# Patient Record
Sex: FEMALE | Race: WHITE | Employment: OTHER | URBAN - METROPOLITAN AREA
[De-identification: names, ages, dates, MRNs, and addresses within clinical notes are randomized per-mention and may not be internally consistent; named-entity substitution may affect disease eponyms.]

---

## 2017-04-06 PROBLEM — Z09 FOLLOW-UP EXAMINATION: Status: ACTIVE | Noted: 2017-04-06

## 2017-04-12 ENCOUNTER — OFFICE VISIT (OUTPATIENT)
Dept: CARDIOLOGY CLINIC | Age: 62
End: 2017-04-12

## 2017-04-12 VITALS
HEIGHT: 68 IN | WEIGHT: 201 LBS | BODY MASS INDEX: 30.46 KG/M2 | OXYGEN SATURATION: 98 % | HEART RATE: 86 BPM | SYSTOLIC BLOOD PRESSURE: 124 MMHG | DIASTOLIC BLOOD PRESSURE: 80 MMHG

## 2017-04-12 DIAGNOSIS — I25.10 CORONARY ARTERY DISEASE INVOLVING NATIVE CORONARY ARTERY OF NATIVE HEART WITHOUT ANGINA PECTORIS: ICD-10-CM

## 2017-04-12 DIAGNOSIS — I10 ESSENTIAL HYPERTENSION: ICD-10-CM

## 2017-04-12 DIAGNOSIS — E78.00 PURE HYPERCHOLESTEROLEMIA: ICD-10-CM

## 2017-04-12 PROCEDURE — 99214 OFFICE O/P EST MOD 30 MIN: CPT | Performed by: INTERNAL MEDICINE

## 2017-04-20 RX ORDER — NITROGLYCERIN 0.4 MG/1
0.4 TABLET SUBLINGUAL EVERY 5 MIN PRN
Qty: 25 TABLET | Refills: 3 | Status: SHIPPED | OUTPATIENT
Start: 2017-04-20 | End: 2017-04-20 | Stop reason: SDUPTHER

## 2017-04-20 RX ORDER — NITROGLYCERIN 0.4 MG/1
TABLET SUBLINGUAL
Qty: 25 TABLET | Refills: 3 | Status: SHIPPED | OUTPATIENT
Start: 2017-04-20 | End: 2018-11-12 | Stop reason: SDUPTHER

## 2017-05-05 ENCOUNTER — TELEPHONE (OUTPATIENT)
Dept: CARDIOLOGY CLINIC | Age: 62
End: 2017-05-05

## 2017-05-31 ENCOUNTER — TELEPHONE (OUTPATIENT)
Dept: CARDIOLOGY CLINIC | Age: 62
End: 2017-05-31

## 2017-08-08 RX ORDER — CLOPIDOGREL BISULFATE 75 MG/1
75 TABLET ORAL DAILY
Qty: 90 TABLET | Refills: 3 | Status: SHIPPED | OUTPATIENT
Start: 2017-08-08 | End: 2018-07-25 | Stop reason: SDUPTHER

## 2017-09-19 RX ORDER — ATORVASTATIN CALCIUM 20 MG/1
TABLET, FILM COATED ORAL
Qty: 90 TABLET | Refills: 1 | Status: SHIPPED | OUTPATIENT
Start: 2017-09-19 | End: 2018-03-25 | Stop reason: SDUPTHER

## 2017-10-13 ENCOUNTER — TELEPHONE (OUTPATIENT)
Dept: CARDIOLOGY CLINIC | Age: 62
End: 2017-10-13

## 2017-10-13 NOTE — TELEPHONE ENCOUNTER
Dalia Darby is calling stating that Dr. Beni Torre usually wants her to have a fasting Lipid Panel 1 week before visit. Her next visit is 10/24/2017. Dalia Darby states she is home bound and if we call [facility] @ [phone] they will come out on 10/17/2017 and take her blood. Martina's last visit with Dr. Beni Torre was 4/12/2017. Last Lipid was 8/24/2016      Please call Dalia Darby at [phone] to let her know if Dr. Beni Torre does or does not want the Lipid panel done before next visit.

## 2017-10-13 NOTE — TELEPHONE ENCOUNTER
Called patient. Bishop Harvey said that she has a HCN coming to her home weekly and they will draw any labs required. I requested the name and number of HCN. [facility] [phone]. I gave verbal orders for BMP, Lipid and Liver profile. Nehemiah will fax order for Dr. Sherry Moe to sign and return. Bennyalexus Tolbert will make arrangements with patient so we have patient's lab results by 10/23/17 since patient has an appointment the next day. Called patient to make aware. Patient verbalized and confirmed understanding.

## 2017-10-20 ENCOUNTER — HOSPITAL ENCOUNTER (OUTPATIENT)
Dept: MAMMOGRAPHY | Age: 62
Discharge: HOME | End: 2017-10-20
Admitting: INTERNAL MEDICINE

## 2017-10-20 DIAGNOSIS — I12.9 HYPERTENSIVE CHRONIC KIDNEY DISEASE WITH STAGE 1 THROUGH STAGE 4 CHRONIC KIDNEY DISEASE, OR UNSPECIFIED CHRONIC KIDNEY DISEASE: ICD-10-CM

## 2017-10-20 DIAGNOSIS — Z12.39 BREAST CANCER SCREENING: ICD-10-CM

## 2017-10-24 ENCOUNTER — OFFICE VISIT (OUTPATIENT)
Dept: CARDIOLOGY CLINIC | Age: 62
End: 2017-10-24

## 2017-10-24 ENCOUNTER — HOSPITAL ENCOUNTER (OUTPATIENT)
Dept: NON INVASIVE DIAGNOSTICS | Age: 62
Discharge: HOME | End: 2017-10-24
Attending: INTERNAL MEDICINE | Admitting: INTERNAL MEDICINE

## 2017-10-24 VITALS
SYSTOLIC BLOOD PRESSURE: 126 MMHG | OXYGEN SATURATION: 98 % | HEIGHT: 68 IN | DIASTOLIC BLOOD PRESSURE: 76 MMHG | WEIGHT: 216 LBS | BODY MASS INDEX: 32.74 KG/M2 | HEART RATE: 86 BPM

## 2017-10-24 DIAGNOSIS — E78.00 PURE HYPERCHOLESTEROLEMIA: ICD-10-CM

## 2017-10-24 DIAGNOSIS — I10 ESSENTIAL HYPERTENSION: ICD-10-CM

## 2017-10-24 DIAGNOSIS — I25.10 CORONARY ARTERY DISEASE INVOLVING NATIVE CORONARY ARTERY OF NATIVE HEART WITHOUT ANGINA PECTORIS: Primary | ICD-10-CM

## 2017-10-24 PROCEDURE — 3014F SCREEN MAMMO DOC REV: CPT | Performed by: INTERNAL MEDICINE

## 2017-10-24 PROCEDURE — 99214 OFFICE O/P EST MOD 30 MIN: CPT | Performed by: INTERNAL MEDICINE

## 2017-10-24 PROCEDURE — G8427 DOCREV CUR MEDS BY ELIG CLIN: HCPCS | Performed by: INTERNAL MEDICINE

## 2017-10-24 PROCEDURE — G8598 ASA/ANTIPLAT THER USED: HCPCS | Performed by: INTERNAL MEDICINE

## 2017-10-24 PROCEDURE — 3017F COLORECTAL CA SCREEN DOC REV: CPT | Performed by: INTERNAL MEDICINE

## 2017-10-24 PROCEDURE — 1036F TOBACCO NON-USER: CPT | Performed by: INTERNAL MEDICINE

## 2017-10-24 PROCEDURE — G8484 FLU IMMUNIZE NO ADMIN: HCPCS | Performed by: INTERNAL MEDICINE

## 2017-10-24 PROCEDURE — G8417 CALC BMI ABV UP PARAM F/U: HCPCS | Performed by: INTERNAL MEDICINE

## 2017-10-24 RX ORDER — INSULIN GLARGINE 100 [IU]/ML
75 INJECTION, SOLUTION SUBCUTANEOUS DAILY
COMMUNITY

## 2017-10-24 NOTE — PATIENT INSTRUCTIONS
Patient Education        High Cholesterol: Care Instructions  Your Care Instructions  Cholesterol is a type of fat in your blood. It is needed for many body functions, such as making new cells. Cholesterol is made by your body. It also comes from food you eat. High cholesterol means that you have too much of the fat in your blood. This raises your risk of a heart attack and stroke. LDL and HDL are part of your total cholesterol. LDL is the \"bad\" cholesterol. High LDL can raise your risk for heart disease, heart attack, and stroke. HDL is the \"good\" cholesterol. It helps clear bad cholesterol from the body. High HDL is linked with a lower risk of heart disease, heart attack, and stroke. Your cholesterol levels help your doctor find out your risk for having a heart attack or stroke. You and your doctor can talk about whether you need to lower your risk and what treatment is best for you. A heart-healthy lifestyle along with medicines can help lower your cholesterol and your risk. The way you choose to lower your risk will depend on how high your risk is for heart attack and stroke. It will also depend on how you feel about taking medicines. Follow-up care is a key part of your treatment and safety. Be sure to make and go to all appointments, and call your doctor if you are having problems. It's also a good idea to know your test results and keep a list of the medicines you take. How can you care for yourself at home? · Eat a variety of foods every day. Good choices include fruits, vegetables, whole grains (like oatmeal), dried beans and peas, nuts and seeds, soy products (like tofu), and fat-free or low-fat dairy products. · Replace butter, margarine, and hydrogenated or partially hydrogenated oils with olive and canola oils. (Canola oil margarine without trans fat is fine.)  · Replace red meat with fish, poultry, and soy protein (like tofu).   · Limit processed and packaged foods like chips, crackers, and cookies. · Bake, broil, or steam foods. Don't bartlett them. · Be physically active. Get at least 30 minutes of exercise on most days of the week. Walking is a good choice. You also may want to do other activities, such as running, swimming, cycling, or playing tennis or team sports. · Stay at a healthy weight or lose weight by making the changes in eating and physical activity listed above. Losing just a small amount of weight, even 5 to 10 pounds, can reduce your risk for having a heart attack or stroke. · Do not smoke. When should you call for help? Watch closely for changes in your health, and be sure to contact your doctor if:  · You need help making lifestyle changes. · You have questions about your medicine. Where can you learn more? Go to https://Allokapepiceweb.Wellocities. org and sign in to your [employer] account. Enter L720 in the [employer] box to learn more about \"High Cholesterol: Care Instructions. \"     If you do not have an account, please click on the \"Sign Up Now\" link. Current as of: April 3, 2017  Content Version: 11.3  © 9967-4221 [employer]. Care instructions adapted under license by [facility] ([facility]). If you have questions about a medical condition or this instruction, always ask your healthcare professional. [address] any warranty or liability for your use of this information. Patient Education        High Blood Pressure: Care Instructions  Your Care Instructions  If your blood pressure is usually above 140/90, you have high blood pressure, or hypertension. That means the top number is 140 or higher or the bottom number is 90 or higher, or both. Despite what a lot of people think, high blood pressure usually doesn't cause headaches or make you feel dizzy or lightheaded. It usually has no symptoms. But it does increase your risk for heart attack, stroke, and kidney or eye damage.  The higher your blood pressure, the more your risk or do other activities. · Avoid or limit alcohol. Talk to your doctor about whether you can drink any alcohol. · Try to limit how much sodium you eat to less than 2,300 milligrams (mg) a day. Your doctor may ask you to try to eat less than 1,500 mg a day. · Eat plenty of fruits (such as bananas and oranges), vegetables, legumes, whole grains, and low-fat dairy products. · Lower the amount of saturated fat in your diet. Saturated fat is found in animal products such as milk, cheese, and meat. Limiting these foods may help you lose weight and also lower your risk for heart disease. · Do not smoke. Smoking increases your risk for heart attack and stroke. If you need help quitting, talk to your doctor about stop-smoking programs and medicines. These can increase your chances of quitting for good. When should you call for help? Call 911 anytime you think you may need emergency care. This may mean having symptoms that suggest that your blood pressure is causing a serious heart or blood vessel problem. Your blood pressure may be over 180/110. For example, call 911 if:  · You have symptoms of a heart attack. These may include:  ¨ Chest pain or pressure, or a strange feeling in the chest.  ¨ Sweating. ¨ Shortness of breath. ¨ Nausea or vomiting. ¨ Pain, pressure, or a strange feeling in the back, neck, jaw, or upper belly or in one or both shoulders or arms. ¨ Lightheadedness or sudden weakness. ¨ A fast or irregular heartbeat. · You have symptoms of a stroke. These may include:  ¨ Sudden numbness, tingling, weakness, or loss of movement in your face, arm, or leg, especially on only one side of your body. ¨ Sudden vision changes. ¨ Sudden trouble speaking. ¨ Sudden confusion or trouble understanding simple statements. ¨ Sudden problems with walking or balance. ¨ A sudden, severe headache that is different from past headaches. · You have severe back or belly pain.   Do not wait until your blood pressure comes down on its own. Get help right away. Call your doctor now or seek immediate care if:  · Your blood pressure is much higher than normal (such as 180/110 or higher), but you don't have symptoms. · You think high blood pressure is causing symptoms, such as:  ¨ Severe headache. ¨ Blurry vision. Watch closely for changes in your health, and be sure to contact your doctor if:  · Your blood pressure measures 140/90 or higher at least 2 times. That means the top number is 140 or higher or the bottom number is 90 or higher, or both. · You think you may be having side effects from your blood pressure medicine. · Your blood pressure is usually normal, but it goes above normal at least 2 times. Where can you learn more? Go to https://HighGround.[employer]. org and sign in to your [employer] account. Enter S430 in the [employer] box to learn more about \"High Blood Pressure: Care Instructions. \"     If you do not have an account, please click on the \"Sign Up Now\" link. Current as of: August 8, 2016  Content Version: 11.3  © 7277-4235 [employer]. Care instructions adapted under license by [facility] ([facility]). If you have questions about a medical condition or this instruction, always ask your healthcare professional. [address] any warranty or liability for your use of this information.

## 2017-10-24 NOTE — PROGRESS NOTES
[facility]      Cardiology follow up    Jeanna Grant  1955 October 24, 2017     CC: \"I was in the hospital in [place]"    HPI:  The patient is 58 y.o. female with a past medical history significant for CAD,S/p PCI and HTN who is here for a follow up. She was admitted to [facility] in 8/15/2017 for severe dehydration secondary to intractable nausea vomiting and diarrhea for 10 days prior. She developed acute renal failure and acute pancreatitis. She was seen by nephrology and is currently following with them. She reports her BP has been up and down, she is wearing a 24 hr BP monitor that she had placed today. She is now living in a SNF due to extreme weakness after her admission. She is getting stronger and is almost ready to walk without her cane. She did have one episode of chest tightness but it was during the time when she was 30 lbs from all the IV fluid therre were giving her. She has had no additional chest pain. All of her medications were adjusted and she has been off her statin therapy but will be resuming. She is compliant with her medications and tolerating well. He denies CP, pressure, tightness, edema, SOB, heart racing, palpitations, lightheaded, dizziness, PND or orthopnea.       Past Medical History:   Diagnosis Date    Allergic     Anemia     Angina pectoris (HCC)     Anxiety     Arthritis     CAD (coronary artery disease)     Chronic kidney disease     Colitis     Depression     Frequent headaches     GERD (gastroesophageal reflux disease)     Gout     Heart disease     Heart murmur     Hyperlipidemia     Hypertension     IBS (irritable bowel syndrome)     Lung disease     Migraines     Obesity     Shortness of breath     Sjogren's disease [facility]) September 2015    Dr. Peggy Jason Thyroid disease     hypo when younger    Type 2 diabetes mellitus without complication ([facility] [address]     Type II or unspecified type diabetes mellitus without mention of complication, not stated as uncontrolled     Unspecified cerebral artery occlusion with cerebral infarction     TIA X 2     Past Surgical History:   Procedure Laterality Date    CARDIAC CATHETERIZATION      CORONARY ANGIOPLASTY WITH STENT PLACEMENT  3/27/2014    DILATION AND CURETTAGE OF UTERUS      X2    HYSTERECTOMY      OVARY REMOVAL  1991    SINUS SURGERY      URETHRAL STRICTURE DILATATION       Family History   Problem Relation Age of Onset    Diabetes Mother     Heart Disease Mother     Osteoarthritis Mother     Cancer Mother      colon    Heart Disease Father     Coronary Art Dis Father     Hypertension Father     Seizures Son     Osteoarthritis Sister     Seizures Other      family history    Diabetes Other      family history    Osteoarthritis Other      family history    Coronary Art Dis Other      family history    Alzheimer's Disease Other      family history    Hypertension Brother     Elevated Lipids Brother     Osteoarthritis Brother     Allergies Brother      Social History   Substance Use Topics    Smoking status: Never Smoker    Smokeless tobacco: Never Used    Alcohol use No      Comment: not reorted       Allergies   Allergen Reactions    Bee Venom     Hydroxychloroquine Other (See Comments)     pancreatitis    Macrodantin [Nitrofurantoin]      High fevers    Quercus Robur     Sulfa Antibiotics      Doesn't remember    Wasp Venom     Ciprofloxacin Nausea And Vomiting    Flagyl [Metronidazole] Nausea And Vomiting     Current Outpatient Prescriptions   Medication Sig Dispense Refill    insulin glargine (LANTUS) 100 UNIT/ML injection vial Inject 30 Units into the skin nightly      atorvastatin (LIPITOR) 20 MG tablet TAKE 1 TABLET BY MOUTH EVERY NIGHT 90 tablet 1    clopidogrel (PLAVIX) 75 MG tablet Take 1 tablet by mouth daily 90 tablet 3    nitroGLYCERIN (NITROSTAT) 0.4 MG SL tablet PLACE 1 TABLET UNDER THE TONGUE EVERY 5 MINUTES AS NEEDED FOR CHEST PAIN 25 tablet 3    metoprolol succinate (TOPROL XL) 25 MG extended release tablet TAKE 1 TABLET BY MOUTH DAILY (Patient taking differently: 1/2 tablet daily) 90 tablet 3    Cetirizine HCl (ZYRTEC ALLERGY PO) Take by mouth      verapamil (CALAN-SR) 180 MG CR tablet Take 90 mg by mouth every morning       Liraglutide (VICTOZA) 18 MG/3ML SOPN SC injection Inject 1.2 mg into the skin daily      pilocarpine (SALAGEN) 5 MG tablet Take 5 mg by mouth 3 times daily      bumetanide (BUMEX) 2 MG tablet Take 1 mg by mouth daily       aspirin 81 MG chewable tablet Take 1 tablet by mouth daily. 30 tablet 3    Linaclotide (LINZESS) 290 MCG CAPS Take 290 mcg by mouth every morning (before breakfast)       tramadol (ULTRAM) 50 MG tablet Take 50 mg by mouth every 8 hours as needed for Pain       dexlansoprazole (DEXILANT) 60 MG CPDR capsule Take 60 mg by mouth daily.  metformin (GLUCOPHAGE) 500 MG tablet Take 500 mg by mouth 2 times daily (with meals)       FLUTICASONE PROPIONATE, NASAL, NA 2 sprays by Nasal route daily as needed (rhinitis)       Potassium Chloride (KLOR-CON 10 PO) Take 2 tablets by mouth daily       lisinopril (PRINIVIL;ZESTRIL) 5 MG tablet Take 5 mg by mouth 2 times daily       Multiple Vitamins-Minerals (MULTI FOR HER PO) Take 1 tablet by mouth daily.  docusate sodium (STOOL SOFTENER) 100 MG capsule Take 200 mg by mouth 2 times daily 2 tablets      Calcium Carbonate Antacid (TUMS E-X 750 PO) Take 1 tablet by mouth daily. No current facility-administered medications for this visit. Review of Systems:  Review of systems is as detailed above and otherwise negative.      Physical Exam:   /76   Pulse 86   Ht 5' 8\" (1.727 m)   Wt 216 lb (98 kg) Comment: without shoes  SpO2 98%   BMI 32.84 kg/m²   Wt Readings from Last 3 Encounters:   10/24/17 216 lb (98 kg)   04/12/17 201 lb (91.2 kg)   04/06/17 203 lb (92.1 kg)     Constitutional: She is oriented to person, place, and

## 2017-12-22 RX ORDER — METOPROLOL SUCCINATE 25 MG/1
TABLET, EXTENDED RELEASE ORAL
Qty: 90 TABLET | Refills: 0 | Status: SHIPPED | OUTPATIENT
Start: 2017-12-22 | End: 2018-10-09 | Stop reason: SDUPTHER

## 2017-12-27 ENCOUNTER — TELEPHONE (OUTPATIENT)
Dept: CARDIOLOGY CLINIC | Age: 62
End: 2017-12-27

## 2017-12-27 NOTE — TELEPHONE ENCOUNTER
Dr. Devon Espinoza     Patient last seen on 10/24/17. DX: CAD, HTN, HLD. S/p PTCA/Stent on 3/27/14. Patient is needing colonoscopy and endoscopy. Will need to hold Plavix and ASA for 5 days prior. Procedures scheduled for 1/4/18. Please advise if patient able to proceed and hold medications.

## 2018-03-29 RX ORDER — ATORVASTATIN CALCIUM 20 MG/1
TABLET, FILM COATED ORAL
Qty: 90 TABLET | Refills: 3 | Status: SHIPPED | OUTPATIENT
Start: 2018-03-29 | End: 2018-06-26 | Stop reason: DRUGHIGH

## 2018-04-20 RX ORDER — ATORVASTATIN CALCIUM 20 MG/1
TABLET, FILM COATED ORAL
Qty: 90 TABLET | Refills: 1 | Status: SHIPPED | OUTPATIENT
Start: 2018-04-20 | End: 2018-05-02 | Stop reason: SDUPTHER

## 2018-05-02 ENCOUNTER — OFFICE VISIT (OUTPATIENT)
Dept: CARDIOLOGY CLINIC | Age: 63
End: 2018-05-02

## 2018-05-02 VITALS
OXYGEN SATURATION: 99 % | HEIGHT: 68 IN | WEIGHT: 222 LBS | DIASTOLIC BLOOD PRESSURE: 78 MMHG | BODY MASS INDEX: 33.65 KG/M2 | SYSTOLIC BLOOD PRESSURE: 130 MMHG | HEART RATE: 86 BPM

## 2018-05-02 DIAGNOSIS — I10 ESSENTIAL HYPERTENSION: ICD-10-CM

## 2018-05-02 DIAGNOSIS — I25.10 CORONARY ARTERY DISEASE INVOLVING NATIVE CORONARY ARTERY OF NATIVE HEART WITHOUT ANGINA PECTORIS: Primary | ICD-10-CM

## 2018-05-02 DIAGNOSIS — E78.2 MIXED HYPERLIPIDEMIA: ICD-10-CM

## 2018-05-02 PROCEDURE — G8598 ASA/ANTIPLAT THER USED: HCPCS | Performed by: INTERNAL MEDICINE

## 2018-05-02 PROCEDURE — G8417 CALC BMI ABV UP PARAM F/U: HCPCS | Performed by: INTERNAL MEDICINE

## 2018-05-02 PROCEDURE — 99214 OFFICE O/P EST MOD 30 MIN: CPT | Performed by: INTERNAL MEDICINE

## 2018-05-02 PROCEDURE — 3017F COLORECTAL CA SCREEN DOC REV: CPT | Performed by: INTERNAL MEDICINE

## 2018-05-02 PROCEDURE — G8427 DOCREV CUR MEDS BY ELIG CLIN: HCPCS | Performed by: INTERNAL MEDICINE

## 2018-05-02 PROCEDURE — 1036F TOBACCO NON-USER: CPT | Performed by: INTERNAL MEDICINE

## 2018-05-02 PROCEDURE — 93000 ELECTROCARDIOGRAM COMPLETE: CPT | Performed by: INTERNAL MEDICINE

## 2018-05-02 RX ORDER — TOPIRAMATE 100 MG/1
100 TABLET, FILM COATED ORAL DAILY
COMMUNITY
End: 2020-08-24 | Stop reason: ALTCHOICE

## 2018-05-02 RX ORDER — ICOSAPENT ETHYL 1000 MG/1
2 CAPSULE ORAL 2 TIMES DAILY
Qty: 16 CAPSULE | Refills: 0 | COMMUNITY
Start: 2018-05-02 | End: 2018-11-12

## 2018-05-10 ENCOUNTER — TELEPHONE (OUTPATIENT)
Dept: CARDIOLOGY CLINIC | Age: 63
End: 2018-05-10

## 2018-06-20 ENCOUNTER — TELEPHONE (OUTPATIENT)
Dept: CARDIOLOGY CLINIC | Age: 63
End: 2018-06-20

## 2018-06-26 RX ORDER — ATORVASTATIN CALCIUM 40 MG/1
40 TABLET, FILM COATED ORAL DAILY
COMMUNITY
End: 2018-06-26 | Stop reason: SDUPTHER

## 2018-06-26 RX ORDER — ATORVASTATIN CALCIUM 40 MG/1
40 TABLET, FILM COATED ORAL DAILY
Qty: 90 TABLET | Refills: 3 | Status: SHIPPED | OUTPATIENT
Start: 2018-06-26 | End: 2019-07-16 | Stop reason: SDUPTHER

## 2018-07-25 RX ORDER — CLOPIDOGREL BISULFATE 75 MG/1
75 TABLET ORAL DAILY
Qty: 90 TABLET | Refills: 3 | Status: SHIPPED | OUTPATIENT
Start: 2018-07-25 | End: 2019-07-27 | Stop reason: SDUPTHER

## 2018-09-13 ENCOUNTER — TELEPHONE (OUTPATIENT)
Dept: CARDIOLOGY CLINIC | Age: 63
End: 2018-09-13

## 2018-09-13 NOTE — TELEPHONE ENCOUNTER
Patient would like to know if Dr. Gino Andrade is ok with her increasing her domperidone (pt states she buys it in [facility])) from 10mg daily to 30mg daily. You can reach the pt at [phone].

## 2018-09-26 PROBLEM — Z09 FOLLOW-UP EXAMINATION: Status: RESOLVED | Noted: 2017-04-06 | Resolved: 2018-09-26

## 2018-10-10 RX ORDER — METOPROLOL SUCCINATE 25 MG/1
TABLET, EXTENDED RELEASE ORAL
Qty: 90 TABLET | Refills: 0 | Status: SHIPPED | OUTPATIENT
Start: 2018-10-10 | End: 2019-01-09 | Stop reason: SDUPTHER

## 2018-11-12 ENCOUNTER — OFFICE VISIT (OUTPATIENT)
Dept: CARDIOLOGY CLINIC | Age: 63
End: 2018-11-12
Payer: MEDICARE

## 2018-11-12 VITALS
HEIGHT: 68 IN | DIASTOLIC BLOOD PRESSURE: 68 MMHG | SYSTOLIC BLOOD PRESSURE: 116 MMHG | BODY MASS INDEX: 33.34 KG/M2 | HEART RATE: 90 BPM | OXYGEN SATURATION: 99 % | WEIGHT: 220 LBS

## 2018-11-12 DIAGNOSIS — E78.2 MIXED HYPERLIPIDEMIA: ICD-10-CM

## 2018-11-12 DIAGNOSIS — I25.10 CORONARY ARTERY DISEASE INVOLVING NATIVE CORONARY ARTERY OF NATIVE HEART WITHOUT ANGINA PECTORIS: Primary | ICD-10-CM

## 2018-11-12 DIAGNOSIS — I10 ESSENTIAL HYPERTENSION: ICD-10-CM

## 2018-11-12 PROCEDURE — G8598 ASA/ANTIPLAT THER USED: HCPCS | Performed by: INTERNAL MEDICINE

## 2018-11-12 PROCEDURE — 3017F COLORECTAL CA SCREEN DOC REV: CPT | Performed by: INTERNAL MEDICINE

## 2018-11-12 PROCEDURE — G8417 CALC BMI ABV UP PARAM F/U: HCPCS | Performed by: INTERNAL MEDICINE

## 2018-11-12 PROCEDURE — G8427 DOCREV CUR MEDS BY ELIG CLIN: HCPCS | Performed by: INTERNAL MEDICINE

## 2018-11-12 PROCEDURE — 1036F TOBACCO NON-USER: CPT | Performed by: INTERNAL MEDICINE

## 2018-11-12 PROCEDURE — G8484 FLU IMMUNIZE NO ADMIN: HCPCS | Performed by: INTERNAL MEDICINE

## 2018-11-12 PROCEDURE — 99214 OFFICE O/P EST MOD 30 MIN: CPT | Performed by: INTERNAL MEDICINE

## 2018-11-12 RX ORDER — RANITIDINE 150 MG/1
150 TABLET ORAL 2 TIMES DAILY
COMMUNITY
End: 2020-08-24 | Stop reason: ALTCHOICE

## 2018-11-12 RX ORDER — NITROGLYCERIN 0.4 MG/1
TABLET SUBLINGUAL
Qty: 25 TABLET | Refills: 6 | Status: SHIPPED | OUTPATIENT
Start: 2018-11-12 | End: 2020-09-04

## 2018-11-12 RX ORDER — BETHANECHOL CHLORIDE 25 MG/1
25 TABLET ORAL 2 TIMES DAILY
COMMUNITY

## 2018-11-12 RX ORDER — LUBIPROSTONE 24 UG/1
24 CAPSULE, GELATIN COATED ORAL 2 TIMES DAILY WITH MEALS
COMMUNITY

## 2019-01-09 RX ORDER — METOPROLOL SUCCINATE 25 MG/1
TABLET, EXTENDED RELEASE ORAL
Qty: 90 TABLET | Refills: 0 | Status: SHIPPED | OUTPATIENT
Start: 2019-01-09 | End: 2019-07-16 | Stop reason: SDUPTHER

## 2019-05-20 NOTE — PROGRESS NOTES
SURGERY      URETHRAL STRICTURE DILATATION       Family History   Problem Relation Age of Onset    Diabetes Mother     Heart Disease Mother     Osteoarthritis Mother     Cancer Mother         colon    Heart Disease Father     Coronary Art Dis Father     Hypertension Father     Seizures Son     Osteoarthritis Sister     Seizures Other         family history    Diabetes Other         family history    Osteoarthritis Other         family history    Coronary Art Dis Other         family history    Alzheimer's Disease Other         family history    Hypertension Brother     Elevated Lipids Brother     Osteoarthritis Brother     Allergies Brother      Social History     Tobacco Use    Smoking status: Never Smoker    Smokeless tobacco: Never Used   Substance Use Topics    Alcohol use: No     Comment: not reorted    Drug use: No     Comment: not reported       Allergies   Allergen Reactions    Bee Venom     Hydroxychloroquine Other (See Comments)     pancreatitis    Macrodantin [Nitrofurantoin]      High fevers    Quercus Robur     Sulfa Antibiotics      Doesn't remember    Wasp Venom     Ciprofloxacin Nausea And Vomiting    Flagyl [Metronidazole] Nausea And Vomiting     Current Outpatient Medications   Medication Sig Dispense Refill    metoprolol succinate (TOPROL XL) 25 MG extended release tablet TAKE 1 TABLET BY MOUTH DAILY 90 tablet 0    lubiprostone (AMITIZA) 24 MCG capsule Take 24 mcg by mouth 2 times daily (with meals)      bethanechol (URECHOLINE) 25 MG tablet Take 25 mg by mouth 2 times daily      ranitidine (ZANTAC) 150 MG tablet Take 150 mg by mouth 2 times daily      nitroGLYCERIN (NITROSTAT) 0.4 MG SL tablet PLACE 1 TABLET UNDER THE TONGUE EVERY 5 MINUTES AS NEEDED FOR CHEST PAIN 25 tablet 6    clopidogrel (PLAVIX) 75 MG tablet TAKE 1 TABLET BY MOUTH DAILY 90 tablet 3    atorvastatin (LIPITOR) 40 MG tablet Take 1 tablet by mouth daily 90 tablet 3    topiramate (TOPAMAX) 100 MG tablet Take 100 mg by mouth daily      insulin glargine (LANTUS) 100 UNIT/ML injection vial Inject 55 Units into the skin nightly       Cetirizine HCl (ZYRTEC ALLERGY PO) Take by mouth      verapamil (CALAN-SR) 180 MG CR tablet Take 90 mg by mouth every morning       Liraglutide (VICTOZA) 18 MG/3ML SOPN SC injection Inject 1.8 mg into the skin daily       pilocarpine (SALAGEN) 5 MG tablet Take 5 mg by mouth 3 times daily      bumetanide (BUMEX) 2 MG tablet Take 1 mg by mouth daily       aspirin 81 MG chewable tablet Take 1 tablet by mouth daily. 30 tablet 3    dexlansoprazole (DEXILANT) 60 MG CPDR capsule Take 60 mg by mouth daily.  metformin (GLUCOPHAGE) 500 MG tablet Take 500 mg by mouth 2 times daily (with meals)       FLUTICASONE PROPIONATE, NASAL, NA 2 sprays by Nasal route daily as needed (rhinitis)       Potassium Chloride (KLOR-CON 10 PO) Take 2 tablets by mouth daily       lisinopril (PRINIVIL;ZESTRIL) 5 MG tablet Take 5 mg by mouth 2 times daily Take 10 mg in the morning and 5 mg in the evening.  Multiple Vitamins-Minerals (MULTI FOR HER PO) Take 1 tablet by mouth daily.  docusate sodium (STOOL SOFTENER) 100 MG capsule Take 200 mg by mouth 2 times daily 2 tablets      Calcium Carbonate Antacid (TUMS E-X 750 PO) Take 1 tablet by mouth daily. No current facility-administered medications for this visit. Review of Systems:  Review of systems is as detailed above and otherwise negative. Physical Exam:   /72 (Site: Right Upper Arm, Position: Sitting)   Pulse 85   Ht 5' 8\" (1.727 m)   Wt 221 lb (100.2 kg) Comment: shoes on  SpO2 98%   BMI 33.60 kg/m²   Wt Readings from Last 3 Encounters:   05/21/19 221 lb (100.2 kg)   11/12/18 220 lb (99.8 kg)   05/02/18 222 lb (100.7 kg)     Constitutional: She is oriented to person, place, and time. She appears well-developed and well-nourished. In no acute distress. Head: Normocephalic and atraumatic.   Pupils equal and round. Neck: Neck supple. No JVP or carotid bruit appreciated. No mass and no thyromegaly present. No lymphadenopathy present. Cardiovascular: Normal rate. Normal heart sounds. Exam reveals no gallop and no friction rub. No murmur heard. Pulmonary/Chest: Effort normal and breath sounds normal. No respiratory distress. She has no wheezes, rhonchi or rales. Abdominal: Soft, non-tender. Bowel sounds are normal. She exhibits no organomegaly, mass or bruit. Extremities: No edema, cyanosis, or clubbing. Pulses are 2+ radial/dorsalis pedis/posterior tibial/carotid bilaterally. Neurological: No gross cranial nerve deficit. Coordination normal.   Skin: Skin is warm and dry. There is no rash or diaphoresis. Psychiatric: She has a normal mood and affect. Her speech is normal and behavior is normal.     Lab Review:   FLP:    Lab Results   Component Value Date    TRIG 181 04/12/2016    HDL 25 04/12/2016    HDL 34 04/03/2012    LDLCALC 72 04/12/2016    LABVLDL 36 04/12/2016     BUN/Creatinine:    Lab Results   Component Value Date    BUN 22 04/12/2016    CREATININE 1.1 04/12/2016     EKG Interpretation: 5/2/18 Sinus  Rhythm  Poor R wave progression     5/21/19 Sinus rhythm     Image Review:     Stress 4/12/2016  Summary    There is normal isotope uptake at stress and rest. There is no evidence of    myocardial ischemia or scar.        With Lexiscan vasodilator stress the patient had no significant ST changes.    Nondiagnostic for ischemia. Abdominal xray 4/2018  Splenic arteryaneurysm left upper quadrant    CT Abdomen and Pelvis 4/2018  Vasculature: Splenic artery 9.2 mm aneurysm       Assessment/Plan:    CAD (coronary artery disease)  She reports that she does have intermittent chest pain at rest. She does have nausea associated with this. She does have gastroparesis and is followed by GI.  I will order an exercise myoview stress test to evaluate this further since some of her symptoms are similar to what she had during her cardiac interventions in the past.. Pt is on BBlocker, Statin and Antiplatelet therapy. Hyperlipidemia  Last lipid profile was drawn on 10/2018 was abnormal for elevated TG and low HDL cholesterol but her  numbers look much better compared to the previous lipid profile. .  She reports her diabetes is under control and HbgA1C was 7.5. It may be contributing to her elevated triglycerides. Will repeat lipid profile. Essential hypertension  Blood pressure is stable today. BMP from 1/2019 stable. Follow up in 6 months. Thank you very much for allowing me to participate in the care of your patient. Please do not hesitate to contact me if you have any questions. This note was scribed in the presence of Dr Emerson Beasley, by Isabel Marshall RN  Physician Attestation:  The scribes documentation has been prepared under my direction and personally reviewed by me in its entirety. I confirm that the note above accurately reflects all work, treatment, procedures, and medical decision making performed by me.     Sincerely,  Princess Whitney MD      [address] [place] [address], [address] [address]  Ph: [phone]  Fax: [phone]

## 2019-05-21 ENCOUNTER — OFFICE VISIT (OUTPATIENT)
Dept: CARDIOLOGY CLINIC | Age: 64
End: 2019-05-21
Payer: MEDICARE

## 2019-05-21 VITALS
OXYGEN SATURATION: 98 % | DIASTOLIC BLOOD PRESSURE: 72 MMHG | HEIGHT: 68 IN | SYSTOLIC BLOOD PRESSURE: 118 MMHG | HEART RATE: 85 BPM | WEIGHT: 221 LBS | BODY MASS INDEX: 33.49 KG/M2

## 2019-05-21 DIAGNOSIS — E78.2 MIXED HYPERLIPIDEMIA: ICD-10-CM

## 2019-05-21 DIAGNOSIS — I25.10 CORONARY ARTERY DISEASE INVOLVING NATIVE CORONARY ARTERY OF NATIVE HEART WITHOUT ANGINA PECTORIS: Primary | ICD-10-CM

## 2019-05-21 DIAGNOSIS — I10 ESSENTIAL HYPERTENSION: ICD-10-CM

## 2019-05-21 DIAGNOSIS — R07.9 CHEST PAIN IN ADULT: ICD-10-CM

## 2019-05-21 PROCEDURE — 1036F TOBACCO NON-USER: CPT | Performed by: INTERNAL MEDICINE

## 2019-05-21 PROCEDURE — G8417 CALC BMI ABV UP PARAM F/U: HCPCS | Performed by: INTERNAL MEDICINE

## 2019-05-21 PROCEDURE — 93000 ELECTROCARDIOGRAM COMPLETE: CPT | Performed by: INTERNAL MEDICINE

## 2019-05-21 PROCEDURE — G8427 DOCREV CUR MEDS BY ELIG CLIN: HCPCS | Performed by: INTERNAL MEDICINE

## 2019-05-21 PROCEDURE — 3017F COLORECTAL CA SCREEN DOC REV: CPT | Performed by: INTERNAL MEDICINE

## 2019-05-21 PROCEDURE — G8598 ASA/ANTIPLAT THER USED: HCPCS | Performed by: INTERNAL MEDICINE

## 2019-05-21 PROCEDURE — 99214 OFFICE O/P EST MOD 30 MIN: CPT | Performed by: INTERNAL MEDICINE

## 2019-05-21 NOTE — PATIENT INSTRUCTIONS
Patient Education        Chest Pain: Care Instructions  Your Care Instructions    There are many things that can cause chest pain. Some are not serious and will get better on their own in a few days. But some kinds of chest pain need more testing and treatment. Your doctor may have recommended a follow-up visit in the next 8 to 12 hours. If you are not getting better, you may need more tests or treatment. Even though your doctor has released you, you still need to watch for any problems. The doctor carefully checked you, but sometimes problems can develop later. If you have new symptoms or if your symptoms do not get better, get medical care right away. If you have worse or different chest pain or pressure that lasts more than 5 minutes or you passed out (lost consciousness), call 911 or seek other emergency help right away. A medical visit is only one step in your treatment. Even if you feel better, you still need to do what your doctor recommends, such as going to all suggested follow-up appointments and taking medicines exactly as directed. This will help you recover and help prevent future problems. How can you care for yourself at home? · Rest until you feel better. · Take your medicine exactly as prescribed. Call your doctor if you think you are having a problem with your medicine. · Do not drive after taking a prescription pain medicine. When should you call for help? Call 911 if:    · You passed out (lost consciousness).     · You have severe difficulty breathing.     · You have symptoms of a heart attack. These may include:  ? Chest pain or pressure, or a strange feeling in your chest.  ? Sweating. ? Shortness of breath. ? Nausea or vomiting. ? Pain, pressure, or a strange feeling in your back, neck, jaw, or upper belly or in one or both shoulders or arms. ? Lightheadedness or sudden weakness. ? A fast or irregular heartbeat.   After you call 911, the  may tell you to chew 1 adult-strength or 2 to 4 low-dose aspirin. Wait for an ambulance. Do not try to drive yourself.    Call your doctor today if:    · You have any trouble breathing.     · Your chest pain gets worse.     · You are dizzy or lightheaded, or you feel like you may faint.     · You are not getting better as expected.     · You are having new or different chest pain. Where can you learn more? Go to https://[employer]CoverMe. org and sign in to your [facility] account. Enter A120 in the [employer] box to learn more about \"Chest Pain: Care Instructions. \"     If you do not have an account, please click on the \"Sign Up Now\" link. Current as of: September 23, 2018  Content Version: 12.0  © 6248-6072 [email or web address]. Care instructions adapted under license by [facility] ([facility]). If you have questions about a medical condition or this instruction, always ask your healthcare professional. [place] [address] any warranty or liability for your use of this information. Patient Education        Chest Pain: Care Instructions  Your Care Instructions    There are many things that can cause chest pain. Some are not serious and will get better on their own in a few days. But some kinds of chest pain need more testing and treatment. Your doctor may have recommended a follow-up visit in the next 8 to 12 hours. If you are not getting better, you may need more tests or treatment. Even though your doctor has released you, you still need to watch for any problems. The doctor carefully checked you, but sometimes problems can develop later. If you have new symptoms or if your symptoms do not get better, get medical care right away. If you have worse or different chest pain or pressure that lasts more than 5 minutes or you passed out (lost consciousness), call 911 or seek other emergency help right away. A medical visit is only one step in your treatment.  Even if you feel better, you still need disclaims any warranty or liability for your use of this information.

## 2019-06-10 ENCOUNTER — HOSPITAL ENCOUNTER (OUTPATIENT)
Dept: NON INVASIVE DIAGNOSTICS | Age: 64
Discharge: HOME | End: 2019-06-10
Payer: MEDICARE

## 2019-06-10 DIAGNOSIS — I25.10 CORONARY ARTERY DISEASE INVOLVING NATIVE CORONARY ARTERY OF NATIVE HEART WITHOUT ANGINA PECTORIS: ICD-10-CM

## 2019-06-10 DIAGNOSIS — R07.9 CHEST PAIN IN ADULT: ICD-10-CM

## 2019-06-10 DIAGNOSIS — E78.2 MIXED HYPERLIPIDEMIA: ICD-10-CM

## 2019-06-10 LAB
CHOLESTEROL, FASTING: 123 MG/DL (ref 0–199)
HDLC SERPL-MCNC: 34 MG/DL (ref 40–60)
LDL CHOLESTEROL CALCULATED: 58 MG/DL
LV EF: 70 %
LVEF MODALITY: NORMAL
TRIGLYCERIDE, FASTING: 157 MG/DL (ref 0–150)
VLDLC SERPL CALC-MCNC: 31 MG/DL

## 2019-06-10 PROCEDURE — 78452 HT MUSCLE IMAGE SPECT MULT: CPT

## 2019-06-10 PROCEDURE — 93017 CV STRESS TEST TRACING ONLY: CPT

## 2019-06-10 PROCEDURE — 3430000000 HC RX DIAGNOSTIC RADIOPHARMACEUTICAL: Performed by: INTERNAL MEDICINE

## 2019-06-10 PROCEDURE — 80061 LIPID PANEL: CPT

## 2019-06-10 PROCEDURE — A9502 TC99M TETROFOSMIN: HCPCS | Performed by: INTERNAL MEDICINE

## 2019-06-10 PROCEDURE — 36415 COLL VENOUS BLD VENIPUNCTURE: CPT

## 2019-06-10 RX ADMIN — TETROFOSMIN 10 MILLICURIE: 1.38 INJECTION, POWDER, LYOPHILIZED, FOR SOLUTION INTRAVENOUS at 12:17

## 2019-06-10 RX ADMIN — TETROFOSMIN 30 MILLICURIE: 1.38 INJECTION, POWDER, LYOPHILIZED, FOR SOLUTION INTRAVENOUS at 13:40

## 2019-07-16 RX ORDER — METOPROLOL SUCCINATE 25 MG/1
TABLET, EXTENDED RELEASE ORAL
Qty: 90 TABLET | Refills: 0 | Status: SHIPPED | OUTPATIENT
Start: 2019-07-16 | End: 2020-02-24

## 2019-07-16 RX ORDER — ATORVASTATIN CALCIUM 40 MG/1
40 TABLET, FILM COATED ORAL DAILY
Qty: 90 TABLET | Refills: 4 | Status: SHIPPED | OUTPATIENT
Start: 2019-07-16 | End: 2020-02-21

## 2019-07-29 RX ORDER — CLOPIDOGREL BISULFATE 75 MG/1
75 TABLET ORAL DAILY
Qty: 90 TABLET | Refills: 4 | Status: SHIPPED | OUTPATIENT
Start: 2019-07-29 | End: 2020-10-22 | Stop reason: SDUPTHER

## 2019-12-19 DIAGNOSIS — I10 ESSENTIAL HYPERTENSION: ICD-10-CM

## 2019-12-19 DIAGNOSIS — E78.2 MIXED HYPERLIPIDEMIA: Primary | ICD-10-CM

## 2020-02-21 ENCOUNTER — OFFICE VISIT (OUTPATIENT)
Dept: CARDIOLOGY CLINIC | Age: 65
End: 2020-02-21
Payer: MEDICARE

## 2020-02-21 VITALS
BODY MASS INDEX: 33.65 KG/M2 | SYSTOLIC BLOOD PRESSURE: 122 MMHG | HEART RATE: 84 BPM | WEIGHT: 222 LBS | DIASTOLIC BLOOD PRESSURE: 60 MMHG | HEIGHT: 68 IN | OXYGEN SATURATION: 97 %

## 2020-02-21 PROCEDURE — G8417 CALC BMI ABV UP PARAM F/U: HCPCS | Performed by: INTERNAL MEDICINE

## 2020-02-21 PROCEDURE — G8400 PT W/DXA NO RESULTS DOC: HCPCS | Performed by: INTERNAL MEDICINE

## 2020-02-21 PROCEDURE — 4040F PNEUMOC VAC/ADMIN/RCVD: CPT | Performed by: INTERNAL MEDICINE

## 2020-02-21 PROCEDURE — 1123F ACP DISCUSS/DSCN MKR DOCD: CPT | Performed by: INTERNAL MEDICINE

## 2020-02-21 PROCEDURE — 1036F TOBACCO NON-USER: CPT | Performed by: INTERNAL MEDICINE

## 2020-02-21 PROCEDURE — G8484 FLU IMMUNIZE NO ADMIN: HCPCS | Performed by: INTERNAL MEDICINE

## 2020-02-21 PROCEDURE — G8427 DOCREV CUR MEDS BY ELIG CLIN: HCPCS | Performed by: INTERNAL MEDICINE

## 2020-02-21 PROCEDURE — 3017F COLORECTAL CA SCREEN DOC REV: CPT | Performed by: INTERNAL MEDICINE

## 2020-02-21 PROCEDURE — 1090F PRES/ABSN URINE INCON ASSESS: CPT | Performed by: INTERNAL MEDICINE

## 2020-02-21 PROCEDURE — 99214 OFFICE O/P EST MOD 30 MIN: CPT | Performed by: INTERNAL MEDICINE

## 2020-02-21 PROCEDURE — 93000 ELECTROCARDIOGRAM COMPLETE: CPT | Performed by: INTERNAL MEDICINE

## 2020-02-21 RX ORDER — ATORVASTATIN CALCIUM 80 MG/1
80 TABLET, FILM COATED ORAL DAILY
Qty: 90 TABLET | Refills: 3 | Status: SHIPPED | OUTPATIENT
Start: 2020-02-21 | End: 2020-07-23 | Stop reason: SDUPTHER

## 2020-02-21 NOTE — PATIENT INSTRUCTIONS
Patient Education        empagliflozin  Pronunciation:  EM pa gli FLOE zin  Brand:  Brina Leblanc  What is the most important information I should know about empagliflozin? You should not use empagliflozin if you have severe kidney disease, or if you are on dialysis. Taking empagliflozin can make you dehydrated, which could cause you to feel weak or dizzy (especially when you stand up). Empagliflozin can cause serious infections in the penis or vagina. Get medical help right away if you have burning, itching, odor, discharge, pain, tenderness, redness or swelling of the genital or rectal area, fever, or if you don't feel well. What is empagliflozin? Empagliflozin is an oral diabetes medicine that helps control blood sugar levels. Empagliflozin works by helping the kidneys get rid of glucose from your bloodstream.  Empagliflozin is used together with diet and exercise to improve blood sugar control in adults with type 2 diabetes mellitus. Empagliflozin is also used to lower the risk of death from heart attack, stroke, or heart failure in adults with type 2 diabetes who also have heart disease. Empagliflozin is not for treating type 1 diabetes. Empagliflozin may also be used for purposes not listed in this medication guide. What should I discuss with my healthcare provider before taking empagliflozin? You should not use empagliflozin if you are allergic to it, or if you have:  · severe kidney disease (or if you are on dialysis). Tell your doctor if you have ever had:  · liver or kidney disease;  · bladder infections or other urination problems;  · low blood pressure;  · heart problems;  · problems with your pancreas, including surgery;  · if you drink alcohol often; or  · if you are on a low salt diet. Follow your doctor's instructions about using this medicine if you are pregnant. Blood sugar control is very important during pregnancy, and your dose needs may be different during each trimester.   You should empagliflozin. Empagliflozin is only part of a treatment program that may also include diet, exercise, weight control, blood sugar testing, and special medical care. Follow your doctor's instructions very closely. Store at room temperature away from moisture and heat. What happens if I miss a dose? Take the medicine as soon as you can, but skip the missed dose if it is almost time for your next dose. Do not take two doses at one time. What happens if I overdose? Seek emergency medical attention or call the Poison Help line at 1-507.636.3404. What should I avoid while taking empagliflozin? Avoid getting up too fast from a sitting or lying position, or you may feel dizzy. What are the possible side effects of empagliflozin? Get emergency medical help if you have signs of an allergic reaction: hives; difficult breathing; swelling of your face, lips, tongue, or throat. Seek medical attention right away if you have signs of a genital infection (penis or vagina): burning, itching, odor, discharge, pain, tenderness, redness or swelling of the genital or rectal area, fever, not feeling well. These symptoms may get worse quickly. Call your doctor at once if you have:  · little or no urination;  · dehydration symptoms --dizziness, weakness, feeling light-headed (like you might pass out);  · ketoacidosis (too much acid in the blood) --nausea, vomiting, stomach pain, confusion, unusual drowsiness, or trouble breathing; or  · signs of a bladder infection --pain or burning when you urinate, increased urination, blood in your urine, fever, pain in your pelvis or back. Older adults may be more likely to have side effects from this medicine. Common side effects may include:  · bladder infection; or  · vaginal yeast infection. This is not a complete list of side effects and others may occur. Call your doctor for medical advice about side effects. You may report side effects to FDA at 1-318-FDA-4297.   What other drugs

## 2020-02-21 NOTE — PROGRESS NOTES
[address]      Cardiology follow up    Virginia Pham  1955 February 21, 2020     CC: \"I did have an episode of chest pain. \"     HPI:  The patient is 72 y.o. female with a past medical history significant for CAD,S/p PCI and HTN who is here for a follow up. She was admitted to [facility] in 8/15/2017 for severe dehydration secondary to intractable nausea vomiting and diarrhea for 10 days prior. She developed acute renal failure and acute pancreatitis. She was seen by nephrology and continues to follow with them. Today, she is here for CAD follow up. She says that she did have an episode of chest pain and heart fluttering in January but none since. She was sick at that time and was placed on antibiotics. Patient denies exertional chest pain/pressure, dyspnea at rest, ZAVALETA, PND, orthopnea,lightheadedness, weight changes, changes in LE edema, and syncope. Patient reports compliance to her medications.                 Past Medical History:   Diagnosis Date    Allergic     Anemia     Angina pectoris (HCC)     Anxiety     Arthritis     CAD (coronary artery disease)     Chronic kidney disease     Colitis     Depression     Frequent headaches     GERD (gastroesophageal reflux disease)     Gout     Heart disease     Heart murmur     Hyperlipidemia     Hypertension     IBS (irritable bowel syndrome)     Lung disease     Migraines     Obesity     Shortness of breath     Sjogren's disease [facility]) September 2015    Dr. Nagy Kappa Thyroid disease     hypo when younger    Type 2 diabetes mellitus without complication ([facility] [address]     Type II or unspecified type diabetes mellitus without mention of complication, not stated as uncontrolled     Unspecified cerebral artery occlusion with cerebral infarction     TIA X 2     Past Surgical History:   Procedure Laterality Date    CARDIAC CATHETERIZATION      CORONARY ANGIOPLASTY WITH STENT PLACEMENT  3/27/2014    DILATION AND CURETTAGE OF 0.4 MG SL tablet PLACE 1 TABLET UNDER THE TONGUE EVERY 5 MINUTES AS NEEDED FOR CHEST PAIN 25 tablet 6    topiramate (TOPAMAX) 100 MG tablet Take 100 mg by mouth daily      insulin glargine (LANTUS) 100 UNIT/ML injection vial Inject 55 Units into the skin nightly       Cetirizine HCl (ZYRTEC ALLERGY PO) Take by mouth      verapamil (CALAN-SR) 180 MG CR tablet Take 90 mg by mouth every morning       Liraglutide (VICTOZA) 18 MG/3ML SOPN SC injection Inject 1.8 mg into the skin daily       pilocarpine (SALAGEN) 5 MG tablet Take 5 mg by mouth 3 times daily      bumetanide (BUMEX) 2 MG tablet Take 1 mg by mouth daily       aspirin 81 MG chewable tablet Take 1 tablet by mouth daily. 30 tablet 3    dexlansoprazole (DEXILANT) 60 MG CPDR capsule Take 60 mg by mouth daily.  metformin (GLUCOPHAGE) 500 MG tablet Take 500 mg by mouth 2 times daily (with meals)       FLUTICASONE PROPIONATE, NASAL, NA 2 sprays by Nasal route daily as needed (rhinitis)       Potassium Chloride (KLOR-CON 10 PO) Take 2 tablets by mouth daily       lisinopril (PRINIVIL;ZESTRIL) 5 MG tablet Take 5 mg by mouth 2 times daily Take 10 mg in the morning and 5 mg in the evening.  Multiple Vitamins-Minerals (MULTI FOR HER PO) Take 1 tablet by mouth daily.  docusate sodium (STOOL SOFTENER) 100 MG capsule Take 200 mg by mouth 2 times daily 2 tablets      Calcium Carbonate Antacid (TUMS E-X 750 PO) Take 1 tablet by mouth daily. No current facility-administered medications for this visit. Review of Systems:  Review of systems is as detailed above and otherwise negative. Physical Exam:   /60   Pulse 84   Ht 5' 8\" (1.727 m)   Wt 222 lb (100.7 kg) Comment: without shoes  SpO2 97%   BMI 33.75 kg/m²   Wt Readings from Last 3 Encounters:   02/21/20 222 lb (100.7 kg)   05/21/19 221 lb (100.2 kg)   11/12/18 220 lb (99.8 kg)     Constitutional: She is oriented to person, place, and time.  She appears well-developed and well-nourished. In no acute distress. Head: Normocephalic and atraumatic. Pupils equal and round. Neck: Neck supple. No JVP or carotid bruit appreciated. No mass and no thyromegaly present. No lymphadenopathy present. Cardiovascular: Normal rate. Normal heart sounds. Exam reveals no gallop and no friction rub. No murmur heard. Pulmonary/Chest: Effort normal and breath sounds normal. No respiratory distress. She has no wheezes, rhonchi or rales. Abdominal: Soft, non-tender. Bowel sounds are normal. She exhibits no organomegaly, mass or bruit. Extremities: No edema, cyanosis, or clubbing. Pulses are 2+ radial/dorsalis pedis/posterior tibial/carotid bilaterally. Neurological: No gross cranial nerve deficit. Coordination normal.   Skin: Skin is warm and dry. There is no rash or diaphoresis. Psychiatric: She has a normal mood and affect. Her speech is normal and behavior is normal.     Lab Review:   FLP:    Lab Results   Component Value Date    TRIG 181 04/12/2016    HDL 34 06/10/2019    HDL 34 04/03/2012    LDLCALC 58 06/10/2019    LABVLDL 31 06/10/2019     BUN/Creatinine:    Lab Results   Component Value Date    BUN 22 04/12/2016    CREATININE 1.1 04/12/2016     EKG Interpretation: 5/2/18 Sinus  Rhythm  Poor R wave progression     5/21/19 Sinus rhythm      2/21/2020 Sinus rhythm Poor R wave progression    Image Review:     Stress 4/12/2016  Summary    There is normal isotope uptake at stress and rest. There is no evidence of    myocardial ischemia or scar.        With Lexiscan vasodilator stress the patient had no significant ST changes.    Nondiagnostic for ischemia. 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  Technically a satisfactory study.    2. Normal treadmill exercise stress portion of the study.    3. No evidence of Ischemia by Myocardial Perfusion Imaging.    4. Gated Study shows normal LV size and Systolic function; EF is 74%.    Pharmacological Stress/MPI Results:       Assessment/Plan:    CAD (coronary artery disease)  She had an episode of chest pain in January, no other symptoms of angina except an episode chest pain in January. Her EKG today shows no acute changes pt is on BBlocker, Statin and Antiplatelet therapy. Hyperlipidemia  Last lipid profile was drawn on 2/17/2020 showed her LDL at 91 and triglycerides were greater than 200. Goal LDL < 70. Will increase her Lipitor to 80 mg daily. Will repeat lipid/liver profile in 3 months. Essential hypertension  Blood pressure  Stable. BMP from 2/2020 stable. Diabetes  This patient has type 2 diabetes and established CV disease. The American Diabetes Association guidelines state, Among patients with type 2 diabetes who have established atherosclerotic cardiovascular disease, SGLT2 inhibitors demonstrated cardiovascular disease benefit are recommended as part of the antihyperglycemic regimen. SGLT2 inhibitor was added to current antihyperglycemic regimen due to demonstrated cardiovascular benefit. I have discussed with her about starting Jardiance. She is agreeable to starting Jardiance 10 mg daily. Follow up in 6 months. Thank you very much for allowing me to participate in the care of your patient. Please do not hesitate to contact me if you have any questions. This note was scribed in the presence of Dr Rosario Alanis, by Gerardo Carbone RN  Physician Attestation:  The scribes documentation has been prepared under my direction and personally reviewed by me in its entirety. I confirm that the note above accurately reflects all work, treatment, procedures, and medical decision making performed by me.     Sincerely,  Basilia Pemberton MD      [facility], [address] [place] [address] Angel Clemons [facility]  Ph: [phone]  Fax: [phone]

## 2020-02-24 RX ORDER — METOPROLOL SUCCINATE 25 MG/1
TABLET, EXTENDED RELEASE ORAL
Qty: 90 TABLET | Refills: 3 | Status: SHIPPED | OUTPATIENT
Start: 2020-02-24 | End: 2020-05-15 | Stop reason: SDUPTHER

## 2020-05-15 ENCOUNTER — TELEPHONE (OUTPATIENT)
Dept: CARDIOLOGY CLINIC | Age: 65
End: 2020-05-15

## 2020-05-15 RX ORDER — METOPROLOL SUCCINATE 25 MG/1
12.5 TABLET, EXTENDED RELEASE ORAL DAILY
Qty: 90 TABLET | Refills: 3 | Status: ON HOLD | OUTPATIENT
Start: 2020-05-15 | End: 2020-11-11 | Stop reason: HOSPADM

## 2020-06-30 ENCOUNTER — TELEPHONE (OUTPATIENT)
Dept: CARDIOLOGY CLINIC | Age: 65
End: 2020-06-30

## 2020-06-30 NOTE — TELEPHONE ENCOUNTER
Noted.     Please contact patient and advise that we will make Dr. Sri Brody aware and to call if palpitations become problematic otherwise Dr. Sri Brody will address at her follow up appointment in august.     Thanks,  Sri Fletcher RN

## 2020-06-30 NOTE — TELEPHONE ENCOUNTER
Dickson Niharika called in this morning stating that she saw her PCP, Dr. Cherie Shultz, and she told Dickson Bundy to call us. Dickson Bundy was taking the medication Singulair and they think that was the cause of the palpitations she was having. She stopped taking it about 2 weeks ago and when she stopped, it stopped. Since then, she has experienced some palpitations here and there, but Dr. Cherie Shultz said that it could just be because the medication is still in her system. You can reach Dickson Bundy at [phone].

## 2020-07-23 RX ORDER — ATORVASTATIN CALCIUM 40 MG/1
40 TABLET, FILM COATED ORAL DAILY
Qty: 90 TABLET | Refills: 4 | OUTPATIENT
Start: 2020-07-23

## 2020-07-24 RX ORDER — ATORVASTATIN CALCIUM 80 MG/1
80 TABLET, FILM COATED ORAL DAILY
Qty: 90 TABLET | Refills: 4 | Status: SHIPPED | OUTPATIENT
Start: 2020-07-24 | End: 2021-10-13 | Stop reason: SDUPTHER

## 2020-08-20 NOTE — PATIENT INSTRUCTIONS
Patient Education        Learning About Coronary Artery Disease (CAD)  What is coronary artery disease? Coronary artery disease (CAD) occurs when plaque builds up in the arteries that bring oxygen-rich blood to your heart. Plaque is a fatty substance made of cholesterol, calcium, and other substances in the blood. This process is called hardening of the arteries, or atherosclerosis. What happens when you have coronary artery disease? · Plaque may narrow the coronary arteries. Narrowed arteries cause poor blood flow. This can lead to angina symptoms such as chest pain or discomfort. If blood flow is completely blocked, you could have a heart attack. · You can slow CAD and reduce the risk of future problems by making changes in your lifestyle. These include quitting smoking and eating heart-healthy foods. · Treatments for CAD, along with changes in your lifestyle, can help you live a longer and healthier life. How can you prevent coronary artery disease? · Do not smoke. It may be the best thing you can do to prevent heart disease. If you need help quitting, talk to your doctor about stop-smoking programs and medicines. These can increase your chances of quitting for good. · Be active. Get at least 30 minutes of exercise on most days of the week. Walking is a good choice. You also may want to do other activities, such as running, swimming, cycling, or playing tennis or team sports. · Eat heart-healthy foods. Eat more fruits and vegetables and less foods that contain saturated and trans fats. Limit alcohol, sodium, and sweets. · Stay at a healthy weight. Lose weight if you need to. · Manage other health problems such as diabetes, high blood pressure, and high cholesterol. How is coronary artery disease treated? · Your doctor will suggest that you make lifestyle changes. For example, your doctor may ask you to eat healthy foods, quit smoking, lose extra weight, and be more active.   · You will have to take medicines. · Your doctor may suggest a procedure to open narrowed or blocked arteries. This is called angioplasty. Or your doctor may suggest using healthy blood vessels to create detours around narrowed or blocked arteries. This is called bypass surgery. Follow-up care is a key part of your treatment and safety. Be sure to make and go to all appointments, and call your doctor if you are having problems. It's also a good idea to know your test results and keep a list of the medicines you take. Where can you learn more? Go to https://Innovent BiologicspepicewAcacia Interactive.ChatLingual. org and sign in to your [employer] account. Enter [phone] in the [facility] box to learn more about \"Learning About Coronary Artery Disease (CAD). \"     If you do not have an account, please click on the \"Sign Up Now\" link. Current as of: December 16, 2019               Content Version: 12.5  © 5471-5104 [employer]. Care instructions adapted under license by [facility] ([facility]). If you have questions about a medical condition or this instruction, always ask your healthcare professional. [place] [address] any warranty or liability for your use of this information. Patient Education        Heart-Healthy Diet: Care Instructions  Your Care Instructions     A heart-healthy diet has lots of vegetables, fruits, nuts, beans, and whole grains, and is low in salt. It limits foods that are high in saturated fat, such as meats, cheeses, and fried foods. It may be hard to change your diet, but even small changes can lower your risk of heart attack and heart disease. Follow-up care is a key part of your treatment and safety. Be sure to make and go to all appointments, and call your doctor if you are having problems. It's also a good idea to know your test results and keep a list of the medicines you take. How can you care for yourself at home? Watch your portions  · Learn what a serving is.  A \"serving\" and a \"portion\" are not always the same thing. Make sure that you are not eating larger portions than are recommended. For example, a serving of pasta is ½ cup. A serving size of meat is 2 to 3 ounces. A 3-ounce serving is about the size of a deck of cards. Measure serving sizes until you are good at [place]" them. Keep in mind that restaurants often serve portions that are 2 or 3 times the size of one serving. · To keep your energy level up and keep you from feeling hungry, eat often but in smaller portions. · Eat only the number of calories you need to stay at a healthy weight. If you need to lose weight, eat fewer calories than your body burns (through exercise and other physical activity). Eat more fruits and vegetables  · Eat a variety of fruit and vegetables every day. Dark green, deep orange, red, or yellow fruits and vegetables are especially good for you. Examples include spinach, carrots, peaches, and berries. · Keep carrots, celery, and other veggies handy for snacks. Buy fruit that is in season and store it where you can see it so that you will be tempted to eat it. · Cook dishes that have a lot of veggies in them, such as stir-fries and soups. Limit saturated and trans fat  · Read food labels, and try to avoid saturated and trans fats. They increase your risk of heart disease. · Use olive or canola oil when you cook. · Bake, broil, grill, or steam foods instead of frying them. · Choose lean meats instead of high-fat meats such as hot dogs and sausages. Cut off all visible fat when you prepare meat. · Eat fish, skinless poultry, and meat alternatives such as soy products instead of high-fat meats. Soy products, such as tofu, may be especially good for your heart. · Choose low-fat or fat-free milk and dairy products. Eat foods high in fiber  · Eat a variety of grain products every day. Include whole-grain foods that have lots of fiber and nutrients.  Examples of whole-grain foods include oats, whole wheat bread, and brown rice. · Buy whole-grain breads and cereals, instead of white bread or pastries. Limit salt and sodium  · Limit how much salt and sodium you eat to help lower your blood pressure. · Taste food before you salt it. Add only a little salt when you think you need it. With time, your taste buds will adjust to less salt. · Eat fewer snack items, fast foods, and other high-salt, processed foods. Check food labels for the amount of sodium in packaged foods. · Choose low-sodium versions of canned goods (such as soups, vegetables, and beans). Limit sugar  · Limit drinks and foods with added sugar. These include candy, desserts, and soda pop. Limit alcohol  · Limit alcohol to no more than 2 drinks a day for men and 1 drink a day for women. Too much alcohol can cause health problems. When should you call for help? Watch closely for changes in your health, and be sure to contact your doctor if:  · You would like help planning heart-healthy meals. Where can you learn more? Go to https://Circalit.imagoo. org and sign in to your [employer] account. Enter V137 in the [employer] box to learn more about \"Heart-Healthy Diet: Care Instructions. \"     If you do not have an account, please click on the \"Sign Up Now\" link. Current as of: August 22, 2019               Content Version: 12.5  © 4482-3950 Healthwise, Incorporated. Care instructions adapted under license by [facility] ([facility]). If you have questions about a medical condition or this instruction, always ask your healthcare professional. [place] [address] any warranty or liability for your use of this information. Patient Education        How to Read a Food Label to Limit Sodium: Care Instructions  Your Care Instructions  Sodium causes your body to hold on to extra water. This can raise your blood pressure and force your heart and kidneys to work harder.  In very serious cases, this could cause you to be put in the hospital. It might even be life-threatening. By limiting sodium, you will feel better and lower your risk of serious problems. Processed foods, fast food, and restaurant foods are the major sources of dietary sodium. The most common name for sodium is salt. Try to limit how much sodium you eat to less than 2,300 milligrams (mg) a day. If you limit your sodium to 1,500 mg a day, you can lower your blood pressure even more. This limit counts all the salt that you eat in foods you cook or in packaged foods. Keep a list of everything you eat and drink. Follow-up care is a key part of your treatment and safety. Be sure to make and go to all appointments, and call your doctor if you are having problems. It's also a good idea to know your test results and keep a list of the medicines you take. How can you care for yourself at home? Read ingredient lists on food labels  · Read the list of ingredients on food labels to help you find how much sodium is in a food. The label lists the ingredients in a food in descending order (from the most to the least). If salt or sodium is high on the list, there may be a lot of sodium in the food. · Know that sodium has different names. Sodium is also called monosodium glutamate (MSG, common in [facility] food), sodium citrate, sodium alginate, sodium hydroxide, and sodium phosphate. Read Nutrition Facts labels  · On most foods, there is a Nutrition Facts label. This will tell you how much sodium is in one serving of food. Look at both the serving size and the sodium amount. The serving size is located at the top of the label, usually right under the \"Nutrition Facts\" title. The amount of sodium is given in the list under the title. It is given in milligrams (mg). ? Check the serving size carefully. A single serving is often very small, and you may eat more than one serving. If this is the case, you will eat more sodium than listed on the label.  For example, if the serving size for a canned soup is 1 cup and the sodium amount is 470 mg, if you have 2 cups you will eat 940 mg of sodium. · The nutrition facts for fresh fruits and vegetables are not listed on the food. They may be listed somewhere in the store. These foods usually have no sodium or low sodium. · The Nutrition Facts label also gives you the Percent Daily Value for sodium. This is how much of the recommended amount of sodium a serving contains. The daily value for sodium is less than 2,300 mg. So if the Percent Daily Value says 50%, this means one serving is giving you half of this, or 1,150 mg. Buy low-sodium foods  · Look for foods that are made with less sodium. Watch for the following words on the label. ? \"Unsalted\" means there is no sodium added to the food. But there may be sodium already in the food naturally. ? \"Sodium-free\" means a serving has less than 5 mg of sodium. ? \"Very low sodium\" means a serving has 35 mg or less of sodium. ? \"Low-sodium\" means a serving has 140 mg or less of sodium. · \"Reduced-sodium\" means that there is 25% less sodium than what the food normally has. This is still usually too much sodium. Try not to buy foods with this on the label. · Buy fresh vegetables, or frozen vegetables without added sauces. Buy low-sodium versions of canned vegetables, soups, and other canned goods. Where can you learn more? Go to https://LOANZpeCellrox.BoatsGo. org and sign in to your [employer] account. Enter [phone] [id number] in the [facility] box to learn more about \"How to Read a Food Label to Limit Sodium: Care Instructions. \"     If you do not have an account, please click on the \"Sign Up Now\" link. Current as of: August 22, 2019               Content Version: 12.5  © 6589-3140 Healthwise, Incorporated. Care instructions adapted under license by [facility] ([facility]).  If you have questions about a medical condition or this instruction, always ask your healthcare professional. Ron Galloway

## 2020-08-20 NOTE — PROGRESS NOTES
[facility]      Cardiology follow up    Guy Haynes  1955 August 20, 2020     CC: \"I have no heart symptoms. \"     HPI:  The patient is 72 y.o. female with a past medical history significant for CAD,S/p PCI and HTN who is here for a follow up. She was admitted to [facility] in 8/15/2017 for severe dehydration secondary to intractable nausea vomiting and diarrhea for 10 days prior. She developed acute renal failure and acute pancreatitis. She was seen by nephrology and continues to follow with them. Today, she is here for CAD follow up. She reports that she has reduced her exercise routine around her home as she continues to social distance, isolate during the COVID-19 pandemic with her history Sjogren's Disease. She completed recent blood work in July. She offers that she continues to do well with no reported cardiac complaints. She reports that she didn't tolerate Singulair due to tachycardia. Has resolved off of Singulair therapy. She denies any episodes of SVT. She reports medical therapy compliance and feels she is tolerating. She denies any abnormal bruising or bleeding on DAPT. Patient denies any symptoms of chest pain, pressure, tightness, shortness of breath at rest, ZAVALETA, nausea, vomiting, near syncope, syncope, heart racing, palpitations, dizziness, lightheaded, PND, orthopnea, wheezing, diaphoresis, BLE edema, bilateral lower extremities pain, cramping or fatigue.                    Past Medical History:   Diagnosis Date    Allergic     Anemia     Angina pectoris (HCC)     Anxiety     Arthritis     CAD (coronary artery disease)     Chronic kidney disease     Colitis     Depression     Frequent headaches     GERD (gastroesophageal reflux disease)     Gout     Heart disease     Heart murmur     Hyperlipidemia     Hypertension     IBS (irritable bowel syndrome)     Lung disease     Migraines     Obesity     Shortness of breath     Sjogren's disease ([address] September 2015    Dr. Salvador Rust Thyroid disease     hypo when younger    Type 2 diabetes mellitus without complication [facility] [address]     Type II or unspecified type diabetes mellitus without mention of complication, not stated as uncontrolled     Unspecified cerebral artery occlusion with cerebral infarction     TIA X 2     Past Surgical History:   Procedure Laterality Date    CARDIAC CATHETERIZATION      CORONARY ANGIOPLASTY WITH STENT PLACEMENT  3/27/2014    DILATION AND CURETTAGE OF UTERUS      X2    HYSTERECTOMY      OVARY REMOVAL  1991    SINUS SURGERY      URETHRAL STRICTURE DILATATION       Family History   Problem Relation Age of Onset    Diabetes Mother     Heart Disease Mother     Osteoarthritis Mother     Cancer Mother         colon    Heart Disease Father     Coronary Art Dis Father     Hypertension Father     Seizures Son     Osteoarthritis Sister     Seizures Other         family history    Diabetes Other         family history    Osteoarthritis Other         family history    Coronary Art Dis Other         family history    Alzheimer's Disease Other         family history    Hypertension Brother     Elevated Lipids Brother     Osteoarthritis Brother     Allergies Brother      Social History     Tobacco Use    Smoking status: Never Smoker    Smokeless tobacco: Never Used   Substance Use Topics    Alcohol use: No     Comment: not reorted    Drug use: No     Comment: not reported       Allergies   Allergen Reactions    Bee Venom     Hydroxychloroquine Other (See Comments)     pancreatitis    Macrodantin [Nitrofurantoin]      High fevers    Quercus Robur     Sulfa Antibiotics      Doesn't remember    Wasp Venom     Ciprofloxacin Nausea And Vomiting    Flagyl [Metronidazole] Nausea And Vomiting     Current Outpatient Medications   Medication Sig Dispense Refill    atorvastatin (LIPITOR) 80 MG tablet Take 1 tablet by mouth daily 90 tablet 4    metoprolol succinate (TOPROL No current facility-administered medications for this visit. Review of Systems:  Review of systems is as detailed above and otherwise negative. Physical Exam:   /66   Pulse 100   Temp 98.3 °F (36.8 °C)   Ht 5' 8\" (1.727 m)   Wt 219 lb (99.3 kg) Comment: with shoes  SpO2 98%   BMI 33.30 kg/m²   Wt Readings from Last 3 Encounters:   02/21/20 222 lb (100.7 kg)   05/21/19 221 lb (100.2 kg)   11/12/18 220 lb (99.8 kg)     Constitutional: She is oriented to person, place, and time. She appears well-developed and well-nourished. In no acute distress. Head: Normocephalic and atraumatic. Pupils equal and round. Neck: Neck supple. No JVP or carotid bruit appreciated. No mass and no thyromegaly present. No lymphadenopathy present. Cardiovascular: Normal rate. Normal heart sounds. Exam reveals no gallop and no friction rub. No murmur heard. Pulmonary/Chest: Effort normal and breath sounds normal. No respiratory distress. She has no wheezes, rhonchi or rales. Abdominal: Soft, non-tender. Bowel sounds are normal. She exhibits no organomegaly, mass or bruit. Extremities: No edema, cyanosis, or clubbing. Pulses are 2+ radial/dorsalis pedis/posterior tibial/carotid bilaterally. Neurological: No gross cranial nerve deficit. Coordination normal.   Skin: Skin is warm and dry. There is no rash or diaphoresis. Psychiatric: She has a normal mood and affect.  Her speech is normal and behavior is normal.     Lab Review:   [facility]     Lab Results   Component Value Date    TRIG 181 04/12/2016    HDL 34 06/10/2019    HDL 34 04/03/2012    LDLCALC 58 06/10/2019    LABVLDL 31 06/10/2019      Lab Results   Component Value Date    BUN 22 04/12/2016    CREATININE 1.1 04/12/2016     EKG Interpretation:   5/2/18 Sinus Rhythm  Poor R wave progression  5/21/19 Sinus rhythm   2/21/2020 Sinus rhythm Poor R wave progression  8/24/20 Sinus  Rhythm with poor R wave progression    Image Review:     Stress 4/12/2016  Summary    There is normal isotope uptake at stress and rest. There is no evidence of    myocardial ischemia or scar.        With Lexiscan vasodilator stress the patient had no significant ST changes.    Nondiagnostic for ischemia. 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 Technically a satisfactory study.    2. Normal treadmill exercise stress portion of the study.    3. No evidence of Ischemia by Myocardial Perfusion Imaging.    4. Gated Study shows normal LV size and Systolic function; EF is 78%.    Pharmacological Stress/MPI Results:       Assessment/Plan:    Coronary artery disease  She denies any signs of symptoms of angina today. Patient is on BBlocker, Statin and Antiplatelet therapy. Hyperlipidemia, unspecified  Last lipid profile was drawn on 2/17/2020 [facility] showed her LDL at 91 and triglycerides were greater than 200. Goal LDL < 70. We increase her Lipitor to 80 mg daily 2/2020. We repeated her lipid/liver profile after 3 months on 6/29/20 HDL 36, , LDLc 68. Continue to monitor with routine blood work. Essential hypertension  Blood pressure is controlled today on medical therapy. BMP 7/6/20 BUN/Cr 25/1.2 [facility]. Diabetes  This patient has type 2 diabetes and established CV disease. The American Diabetes Association guidelines state, Among patients with type 2 diabetes who have established atherosclerotic cardiovascular disease, SGLT2 inhibitors demonstrated cardiovascular disease benefit are recommended as part of the antihyperglycemic regimen. SGLT2 inhibitor was added to current antihyperglycemic regimen due to demonstrated cardiovascular benefit.  We had started her on  Jardiance 10 mg daily but it was determined per her Nephrologist to discontinue due to RTA    Migraines  She reports that her tele-health RN suggested Lopressor instead of Toprol-XL for her migraines. I have asked her to further discuss with her primary care physician. Follow up in 6 months. Instructed to obtain flu vaccine this season and annually. Complexity of medical decision making-high    Thank you very much for allowing me to participate in the care of your patient. Please do not hesitate to contact me if you have any questions. Sincerely,  Luca Escobar MD      [place] [address]  Ph: [phone]  Fax: [phone]    This note was scribed in the presence of Dr. Sushant Fajardo MD by Juanito Botello RN.

## 2020-08-24 ENCOUNTER — OFFICE VISIT (OUTPATIENT)
Dept: CARDIOLOGY CLINIC | Age: 65
End: 2020-08-24
Payer: MEDICARE

## 2020-08-24 VITALS
DIASTOLIC BLOOD PRESSURE: 66 MMHG | SYSTOLIC BLOOD PRESSURE: 116 MMHG | OXYGEN SATURATION: 98 % | BODY MASS INDEX: 33.19 KG/M2 | HEIGHT: 68 IN | TEMPERATURE: 98.3 F | HEART RATE: 100 BPM | WEIGHT: 219 LBS

## 2020-08-24 PROCEDURE — 1036F TOBACCO NON-USER: CPT | Performed by: INTERNAL MEDICINE

## 2020-08-24 PROCEDURE — 99214 OFFICE O/P EST MOD 30 MIN: CPT | Performed by: INTERNAL MEDICINE

## 2020-08-24 PROCEDURE — 3017F COLORECTAL CA SCREEN DOC REV: CPT | Performed by: INTERNAL MEDICINE

## 2020-08-24 PROCEDURE — 93000 ELECTROCARDIOGRAM COMPLETE: CPT | Performed by: INTERNAL MEDICINE

## 2020-08-24 PROCEDURE — 1090F PRES/ABSN URINE INCON ASSESS: CPT | Performed by: INTERNAL MEDICINE

## 2020-08-24 PROCEDURE — G8427 DOCREV CUR MEDS BY ELIG CLIN: HCPCS | Performed by: INTERNAL MEDICINE

## 2020-08-24 PROCEDURE — G8417 CALC BMI ABV UP PARAM F/U: HCPCS | Performed by: INTERNAL MEDICINE

## 2020-08-24 PROCEDURE — 1123F ACP DISCUSS/DSCN MKR DOCD: CPT | Performed by: INTERNAL MEDICINE

## 2020-08-24 PROCEDURE — G8400 PT W/DXA NO RESULTS DOC: HCPCS | Performed by: INTERNAL MEDICINE

## 2020-08-24 PROCEDURE — 4040F PNEUMOC VAC/ADMIN/RCVD: CPT | Performed by: INTERNAL MEDICINE

## 2020-08-24 RX ORDER — FEXOFENADINE HCL 180 MG/1
180 TABLET ORAL DAILY
COMMUNITY

## 2020-08-24 RX ORDER — LUBIPROSTONE 24 UG/1
24 CAPSULE, GELATIN COATED ORAL 2 TIMES DAILY WITH MEALS
COMMUNITY
End: 2020-11-09

## 2020-08-24 RX ORDER — SODIUM BICARBONATE 650 MG/1
325 TABLET ORAL DAILY
COMMUNITY

## 2020-08-24 RX ORDER — FAMOTIDINE 20 MG/1
20 TABLET, FILM COATED ORAL 2 TIMES DAILY
COMMUNITY

## 2020-08-31 ENCOUNTER — TELEPHONE (OUTPATIENT)
Dept: CARDIOLOGY CLINIC | Age: 65
End: 2020-08-31

## 2020-08-31 NOTE — LETTER
Heather Aly  Phone: [phone]  Fax: [phone]    Clara Ventura MD        September 4, 2020     Patient: Rc Tran   YOB: 1955       To Whom It May Concern: It is my medical opinion that [facility] may proceed with scheduled R sided DCR on 9/23/2020 with Dr. Shahbaz Brown. Patient may hold Plavix and Aspirin 5 to 7 days prior. If you have any questions or concerns, please don't hesitate to call.     Sincerely,        Clara Ventura MD

## 2020-08-31 NOTE — TELEPHONE ENCOUNTER
Cardiac Risk Assessment message information:     What type of procedure are you having:     When is your procedure scheduled for:     Who is the physician performing your procedure: Which medications need to be held for your procedure and for how long:       Plavix & aspirin x14 days   Phone Number:     Fax number to send the letter:        Called the number the patient gave me which was the wrong number. I was transferred to the correct office but I was sent to a voicemail. I will wait for the office to call back to fill out these questions and then route the message.         Clinical Staff use:     Cardiologist:  Last Appointment:  Next Appointment:  Are you on any blood thinners:  History of Coronary Artery Disease:  Last stress test:  Last echo:  Device Type and :

## 2020-09-03 NOTE — TELEPHONE ENCOUNTER
Cardiac Risk Assessment message information:     What type of procedure are you having: R sided DCR      When is your procedure scheduled for:9/23/20     Who is the physician performing your procedure:Dr Manjinder Infante    Which medications need to be held for your procedure and for how long: Plavix & Aspirin x 14 days              Phone Number:[phone]     Fax number to send the letter:[phone]

## 2020-09-03 NOTE — TELEPHONE ENCOUNTER
Please advise of cardiac risk assessment for R DCR. Requesting to hold plavix and aspirin for 14 days. Pt last saw Dr. Stacie Beal on 08/24/2020.   past medical history significant for CAD,S/p PCI and HTN

## 2020-09-03 NOTE — TELEPHONE ENCOUNTER
Dr. Obdulia Munoz please review and advise for a pre-operative risk assessment. They are requesting to hold the ASA and Plavix for 14 days.

## 2020-09-04 RX ORDER — NITROGLYCERIN 0.4 MG/1
TABLET SUBLINGUAL
Qty: 25 TABLET | Refills: 6 | Status: SHIPPED | OUTPATIENT
Start: 2020-09-04 | End: 2021-11-18 | Stop reason: SDUPTHER

## 2020-09-09 ENCOUNTER — TELEPHONE (OUTPATIENT)
Dept: CARDIOLOGY CLINIC | Age: 65
End: 2020-09-09

## 2020-09-09 NOTE — TELEPHONE ENCOUNTER
Attempted to return call to Weston Schilder went to [employer]. Left message on [employer] requesting return call. Called patient to let her know that I faxed the cardiac clearance letter again. Dada called back from [facility]. She said that the surgeon will not do the surgery if patient can not hold both aspirin and plavix for at least 10 days so patient does not bleed into the orbit of her eye. Weston Schilder stated she did receive letter today. Please let me know if I should type a new letter stating patient can hold both plavix and aspirin for at least ten days prior to surgery.

## 2020-09-11 NOTE — TELEPHONE ENCOUNTER
Attempted to return call to Evelyn Viveros with Surgeons's office. Left message from Dr. Richelle Reddy and Erica RN on voicemail    Requested return call to confirm. Also called patient to relay same message. Patient verbalized and confirmed understanding. She will call back Dada with surgeon's office to make sure that they rec'd my message.

## 2020-09-11 NOTE — TELEPHONE ENCOUNTER
Typed letter in [facility] Printed on letterhead. Called office [phone] for fax number for Dr. May Hyman. Faxed to [phone]. Fax confirmation rec'd. Scanned into Epic.

## 2020-10-22 RX ORDER — CLOPIDOGREL BISULFATE 75 MG/1
75 TABLET ORAL DAILY
Qty: 90 TABLET | Refills: 4 | Status: SHIPPED | OUTPATIENT
Start: 2020-10-22 | End: 2022-01-05

## 2020-11-09 ENCOUNTER — APPOINTMENT (OUTPATIENT)
Dept: GENERAL RADIOLOGY | Age: 65
DRG: 247 | End: 2020-11-09
Payer: MEDICARE

## 2020-11-09 ENCOUNTER — HOSPITAL ENCOUNTER (INPATIENT)
Age: 65
LOS: 1 days | Discharge: HOME | DRG: 247 | End: 2020-11-11
Attending: EMERGENCY MEDICINE | Admitting: INTERNAL MEDICINE
Payer: MEDICARE

## 2020-11-09 PROBLEM — M35.00 SJOGREN'S DISEASE (HCC): Status: ACTIVE | Noted: 2020-11-09

## 2020-11-09 LAB
A/G RATIO: 1.3 (ref 1.1–2.2)
ALBUMIN SERPL-MCNC: 3.7 G/DL (ref 3.4–5)
ALP BLD-CCNC: 80 U/L (ref 40–129)
ALT SERPL-CCNC: 33 U/L (ref 10–40)
ANION GAP SERPL CALCULATED.3IONS-SCNC: 10 MMOL/L (ref 3–16)
AST SERPL-CCNC: 34 U/L (ref 15–37)
BASOPHILS ABSOLUTE: 0 K/UL (ref 0–0.2)
BASOPHILS RELATIVE PERCENT: 0.6 %
BILIRUB SERPL-MCNC: <0.2 MG/DL (ref 0–1)
BUN BLDV-MCNC: 12 MG/DL (ref 7–20)
CALCIUM SERPL-MCNC: 9.5 MG/DL (ref 8.3–10.6)
CHLORIDE BLD-SCNC: 100 MMOL/L (ref 99–110)
CO2: 25 MMOL/L (ref 21–32)
CREAT SERPL-MCNC: 1 MG/DL (ref 0.6–1.2)
EOSINOPHILS ABSOLUTE: 0.3 K/UL (ref 0–0.6)
EOSINOPHILS RELATIVE PERCENT: 4.6 %
GFR AFRICAN AMERICAN: >60
GFR NON-AFRICAN AMERICAN: 56
GLOBULIN: 2.9 G/DL
GLUCOSE BLD-MCNC: 228 MG/DL (ref 70–99)
HCT VFR BLD CALC: 30.8 % (ref 36–48)
HEMOGLOBIN: 10.3 G/DL (ref 12–16)
LYMPHOCYTES ABSOLUTE: 1.8 K/UL (ref 1–5.1)
LYMPHOCYTES RELATIVE PERCENT: 24.9 %
MCH RBC QN AUTO: 27.2 PG (ref 26–34)
MCHC RBC AUTO-ENTMCNC: 33.4 G/DL (ref 31–36)
MCV RBC AUTO: 81.6 FL (ref 80–100)
MONOCYTES ABSOLUTE: 0.5 K/UL (ref 0–1.3)
MONOCYTES RELATIVE PERCENT: 6.5 %
NEUTROPHILS ABSOLUTE: 4.5 K/UL (ref 1.7–7.7)
NEUTROPHILS RELATIVE PERCENT: 63.4 %
PDW BLD-RTO: 13.8 % (ref 12.4–15.4)
PLATELET # BLD: 218 K/UL (ref 135–450)
PMV BLD AUTO: 9.1 FL (ref 5–10.5)
POTASSIUM REFLEX MAGNESIUM: 3.8 MMOL/L (ref 3.5–5.1)
RBC # BLD: 3.77 M/UL (ref 4–5.2)
SODIUM BLD-SCNC: 135 MMOL/L (ref 136–145)
TOTAL PROTEIN: 6.6 G/DL (ref 6.4–8.2)
TROPONIN: <0.01 NG/ML
WBC # BLD: 7.1 K/UL (ref 4–11)

## 2020-11-09 PROCEDURE — 99283 EMERGENCY DEPT VISIT LOW MDM: CPT

## 2020-11-09 PROCEDURE — 84484 ASSAY OF TROPONIN QUANT: CPT

## 2020-11-09 PROCEDURE — 80053 COMPREHEN METABOLIC PANEL: CPT

## 2020-11-09 PROCEDURE — 71046 X-RAY EXAM CHEST 2 VIEWS: CPT

## 2020-11-09 PROCEDURE — 85025 COMPLETE CBC W/AUTO DIFF WBC: CPT

## 2020-11-09 PROCEDURE — 93005 ELECTROCARDIOGRAM TRACING: CPT | Performed by: EMERGENCY MEDICINE

## 2020-11-09 PROCEDURE — G0378 HOSPITAL OBSERVATION PER HR: HCPCS

## 2020-11-09 PROCEDURE — 6370000000 HC RX 637 (ALT 250 FOR IP): Performed by: NURSE PRACTITIONER

## 2020-11-09 PROCEDURE — 6370000000 HC RX 637 (ALT 250 FOR IP): Performed by: INTERNAL MEDICINE

## 2020-11-09 RX ORDER — ACETAMINOPHEN 650 MG/1
650 SUPPOSITORY RECTAL EVERY 4 HOURS PRN
Status: DISCONTINUED | OUTPATIENT
Start: 2020-11-09 | End: 2020-11-10 | Stop reason: SDUPTHER

## 2020-11-09 RX ORDER — ASPIRIN 81 MG/1
324 TABLET, CHEWABLE ORAL ONCE
Status: COMPLETED | OUTPATIENT
Start: 2020-11-09 | End: 2020-11-09

## 2020-11-09 RX ORDER — ACETAMINOPHEN 325 MG/1
650 TABLET ORAL EVERY 4 HOURS PRN
Status: DISCONTINUED | OUTPATIENT
Start: 2020-11-09 | End: 2020-11-10 | Stop reason: SDUPTHER

## 2020-11-09 RX ORDER — FAMOTIDINE 20 MG/1
20 TABLET, FILM COATED ORAL 2 TIMES DAILY
Status: CANCELLED | OUTPATIENT
Start: 2020-11-09

## 2020-11-09 RX ORDER — METFORMIN HYDROCHLORIDE 500 MG/1
500 TABLET, EXTENDED RELEASE ORAL 2 TIMES DAILY
COMMUNITY
End: 2021-11-16

## 2020-11-09 RX ORDER — MAGNESIUM OXIDE 400 MG/1
400 TABLET ORAL 3 TIMES DAILY
COMMUNITY

## 2020-11-09 RX ORDER — INSULIN GLARGINE 100 [IU]/ML
60 INJECTION, SOLUTION SUBCUTANEOUS DAILY
Status: DISCONTINUED | OUTPATIENT
Start: 2020-11-10 | End: 2020-11-11 | Stop reason: HOSPADM

## 2020-11-09 RX ADMIN — ASPIRIN 324 MG: 81 TABLET, CHEWABLE ORAL at 20:15

## 2020-11-09 RX ADMIN — ACETAMINOPHEN 650 MG: 325 TABLET ORAL at 23:57

## 2020-11-09 RX ADMIN — INSULIN LISPRO 4 UNITS: 100 INJECTION, SOLUTION INTRAVENOUS; SUBCUTANEOUS at 23:57

## 2020-11-09 ASSESSMENT — ENCOUNTER SYMPTOMS
VOMITING: 0
COLOR CHANGE: 0
CONSTIPATION: 0
SHORTNESS OF BREATH: 1
DIARRHEA: 0
COUGH: 0
BACK PAIN: 0
NAUSEA: 0
ABDOMINAL DISTENTION: 0
ABDOMINAL PAIN: 0

## 2020-11-09 ASSESSMENT — PAIN SCALES - GENERAL
PAINLEVEL_OUTOF10: 0
PAINLEVEL_OUTOF10: 0
PAINLEVEL_OUTOF10: 3
PAINLEVEL_OUTOF10: 3

## 2020-11-09 ASSESSMENT — PAIN DESCRIPTION - PAIN TYPE
TYPE: ACUTE PAIN
TYPE: ACUTE PAIN

## 2020-11-09 ASSESSMENT — PAIN - FUNCTIONAL ASSESSMENT: PAIN_FUNCTIONAL_ASSESSMENT: ACTIVITIES ARE NOT PREVENTED

## 2020-11-09 ASSESSMENT — HEART SCORE: ECG: 1

## 2020-11-09 ASSESSMENT — PAIN DESCRIPTION - DESCRIPTORS
DESCRIPTORS: DISCOMFORT
DESCRIPTORS: DISCOMFORT

## 2020-11-09 ASSESSMENT — PAIN DESCRIPTION - LOCATION
LOCATION: CHEST
LOCATION: HEAD

## 2020-11-09 ASSESSMENT — PAIN DESCRIPTION - ONSET: ONSET: SUDDEN

## 2020-11-09 ASSESSMENT — PAIN DESCRIPTION - ORIENTATION: ORIENTATION: ANTERIOR

## 2020-11-09 ASSESSMENT — PAIN DESCRIPTION - PROGRESSION: CLINICAL_PROGRESSION: NOT CHANGED

## 2020-11-09 ASSESSMENT — PAIN DESCRIPTION - FREQUENCY: FREQUENCY: INTERMITTENT

## 2020-11-09 NOTE — ED PROVIDER NOTES
EKG Interpretation    Interpreted by emergency department physician  Time read: 1557    Rhythm: Sinus  Ventricular Rate: 99  QRS Axis: -13  Ectopy: None  Conduction: Sinus rhythm minimal voltage criteria for LVH, inferior infarction age-indeterminate  ST Segments: normal  T Waves: Flattened inferiorly  Q Waves: Inferior    Other findings: Motion artifact but EKG is readable    Compared to EKG on: 8/24/2020 and appears unchanged    Clinical Impression: Sinus rhythm with minimal voltage criteria for LVH with age-indeterminate inferior infarction with T wave flattening inferiorly and Q waves inferior but unchanged from the previous EKG on 08/24/2020    Ean Payton DO  11/09/20 5273

## 2020-11-10 ENCOUNTER — APPOINTMENT (OUTPATIENT)
Dept: CARDIAC CATH/INVASIVE PROCEDURES | Age: 65
DRG: 247 | End: 2020-11-10
Payer: MEDICARE

## 2020-11-10 PROBLEM — Z98.61 CAD S/P PERCUTANEOUS CORONARY ANGIOPLASTY: Status: ACTIVE | Noted: 2020-11-10

## 2020-11-10 PROBLEM — I25.10 CAD S/P PERCUTANEOUS CORONARY ANGIOPLASTY: Status: ACTIVE | Noted: 2020-11-10

## 2020-11-10 LAB
ANION GAP SERPL CALCULATED.3IONS-SCNC: 14 MMOL/L (ref 3–16)
BASOPHILS ABSOLUTE: 0 K/UL (ref 0–0.2)
BASOPHILS RELATIVE PERCENT: 0.7 %
BUN BLDV-MCNC: 13 MG/DL (ref 7–20)
CALCIUM SERPL-MCNC: 8.9 MG/DL (ref 8.3–10.6)
CHLORIDE BLD-SCNC: 103 MMOL/L (ref 99–110)
CO2: 19 MMOL/L (ref 21–32)
CREAT SERPL-MCNC: 1 MG/DL (ref 0.6–1.2)
EKG ATRIAL RATE: 71 BPM
EKG ATRIAL RATE: 99 BPM
EKG DIAGNOSIS: NORMAL
EKG DIAGNOSIS: NORMAL
EKG P AXIS: 42 DEGREES
EKG P AXIS: 43 DEGREES
EKG P-R INTERVAL: 148 MS
EKG P-R INTERVAL: 150 MS
EKG Q-T INTERVAL: 348 MS
EKG Q-T INTERVAL: 434 MS
EKG QRS DURATION: 76 MS
EKG QRS DURATION: 80 MS
EKG QTC CALCULATION (BAZETT): 446 MS
EKG QTC CALCULATION (BAZETT): 471 MS
EKG R AXIS: -12 DEGREES
EKG R AXIS: -13 DEGREES
EKG T AXIS: -5 DEGREES
EKG T AXIS: 9 DEGREES
EKG VENTRICULAR RATE: 71 BPM
EKG VENTRICULAR RATE: 99 BPM
EOSINOPHILS ABSOLUTE: 0.3 K/UL (ref 0–0.6)
EOSINOPHILS RELATIVE PERCENT: 5.3 %
GFR AFRICAN AMERICAN: >60
GFR NON-AFRICAN AMERICAN: 56
GLUCOSE BLD-MCNC: 195 MG/DL (ref 70–99)
GLUCOSE BLD-MCNC: 208 MG/DL (ref 70–99)
GLUCOSE BLD-MCNC: 254 MG/DL (ref 70–99)
GLUCOSE BLD-MCNC: 277 MG/DL (ref 70–99)
GLUCOSE BLD-MCNC: 291 MG/DL (ref 70–99)
GLUCOSE BLD-MCNC: 295 MG/DL (ref 70–99)
HCT VFR BLD CALC: 31.5 % (ref 36–48)
HEMOGLOBIN: 10.4 G/DL (ref 12–16)
LV EF: 67 %
LVEF MODALITY: NORMAL
LYMPHOCYTES ABSOLUTE: 2.3 K/UL (ref 1–5.1)
LYMPHOCYTES RELATIVE PERCENT: 35.5 %
MCH RBC QN AUTO: 27.4 PG (ref 26–34)
MCHC RBC AUTO-ENTMCNC: 32.9 G/DL (ref 31–36)
MCV RBC AUTO: 83.2 FL (ref 80–100)
MONOCYTES ABSOLUTE: 0.4 K/UL (ref 0–1.3)
MONOCYTES RELATIVE PERCENT: 6 %
NEUTROPHILS ABSOLUTE: 3.4 K/UL (ref 1.7–7.7)
NEUTROPHILS RELATIVE PERCENT: 52.5 %
PDW BLD-RTO: 13.7 % (ref 12.4–15.4)
PERFORMED ON: ABNORMAL
PLATELET # BLD: 220 K/UL (ref 135–450)
PMV BLD AUTO: 8.8 FL (ref 5–10.5)
POC ACT LR: 208 SEC
POTASSIUM REFLEX MAGNESIUM: 4.1 MMOL/L (ref 3.5–5.1)
RBC # BLD: 3.78 M/UL (ref 4–5.2)
SODIUM BLD-SCNC: 136 MMOL/L (ref 136–145)
TROPONIN: <0.01 NG/ML
TROPONIN: <0.01 NG/ML
WBC # BLD: 6.4 K/UL (ref 4–11)

## 2020-11-10 PROCEDURE — C1887 CATHETER, GUIDING: HCPCS

## 2020-11-10 PROCEDURE — C1894 INTRO/SHEATH, NON-LASER: HCPCS

## 2020-11-10 PROCEDURE — 80048 BASIC METABOLIC PNL TOTAL CA: CPT

## 2020-11-10 PROCEDURE — 3430000000 HC RX DIAGNOSTIC RADIOPHARMACEUTICAL: Performed by: INTERNAL MEDICINE

## 2020-11-10 PROCEDURE — 85025 COMPLETE CBC W/AUTO DIFF WBC: CPT

## 2020-11-10 PROCEDURE — 93458 L HRT ARTERY/VENTRICLE ANGIO: CPT

## 2020-11-10 PROCEDURE — 6370000000 HC RX 637 (ALT 250 FOR IP): Performed by: INTERNAL MEDICINE

## 2020-11-10 PROCEDURE — 78452 HT MUSCLE IMAGE SPECT MULT: CPT

## 2020-11-10 PROCEDURE — 2709999900 HC NON-CHARGEABLE SUPPLY

## 2020-11-10 PROCEDURE — 6370000000 HC RX 637 (ALT 250 FOR IP)

## 2020-11-10 PROCEDURE — C9600 PERC DRUG-EL COR STENT SING: HCPCS

## 2020-11-10 PROCEDURE — G0378 HOSPITAL OBSERVATION PER HR: HCPCS

## 2020-11-10 PROCEDURE — 85347 COAGULATION TIME ACTIVATED: CPT

## 2020-11-10 PROCEDURE — 1200000000 HC SEMI PRIVATE

## 2020-11-10 PROCEDURE — 2580000003 HC RX 258: Performed by: INTERNAL MEDICINE

## 2020-11-10 PROCEDURE — 84484 ASSAY OF TROPONIN QUANT: CPT

## 2020-11-10 PROCEDURE — A9502 TC99M TETROFOSMIN: HCPCS | Performed by: INTERNAL MEDICINE

## 2020-11-10 PROCEDURE — 6360000004 HC RX CONTRAST MEDICATION: Performed by: INTERNAL MEDICINE

## 2020-11-10 PROCEDURE — 6360000002 HC RX W HCPCS

## 2020-11-10 PROCEDURE — 93017 CV STRESS TEST TRACING ONLY: CPT

## 2020-11-10 PROCEDURE — 99152 MOD SED SAME PHYS/QHP 5/>YRS: CPT

## 2020-11-10 PROCEDURE — 6370000000 HC RX 637 (ALT 250 FOR IP): Performed by: NURSE PRACTITIONER

## 2020-11-10 PROCEDURE — 93010 ELECTROCARDIOGRAM REPORT: CPT | Performed by: INTERNAL MEDICINE

## 2020-11-10 PROCEDURE — 93458 L HRT ARTERY/VENTRICLE ANGIO: CPT | Performed by: INTERNAL MEDICINE

## 2020-11-10 PROCEDURE — C1769 GUIDE WIRE: HCPCS

## 2020-11-10 PROCEDURE — 36415 COLL VENOUS BLD VENIPUNCTURE: CPT

## 2020-11-10 PROCEDURE — C1874 STENT, COATED/COV W/DEL SYS: HCPCS

## 2020-11-10 PROCEDURE — 99153 MOD SED SAME PHYS/QHP EA: CPT

## 2020-11-10 PROCEDURE — 6360000002 HC RX W HCPCS: Performed by: INTERNAL MEDICINE

## 2020-11-10 PROCEDURE — 92928 PRQ TCAT PLMT NTRAC ST 1 LES: CPT | Performed by: INTERNAL MEDICINE

## 2020-11-10 PROCEDURE — 2500000003 HC RX 250 WO HCPCS

## 2020-11-10 PROCEDURE — 99223 1ST HOSP IP/OBS HIGH 75: CPT | Performed by: INTERNAL MEDICINE

## 2020-11-10 RX ORDER — ATROPINE SULFATE 0.4 MG/ML
0.5 AMPUL (ML) INJECTION
Status: ACTIVE | OUTPATIENT
Start: 2020-11-10 | End: 2020-11-10

## 2020-11-10 RX ORDER — ONDANSETRON 2 MG/ML
4 INJECTION INTRAMUSCULAR; INTRAVENOUS EVERY 6 HOURS PRN
Status: DISCONTINUED | OUTPATIENT
Start: 2020-11-10 | End: 2020-11-10 | Stop reason: SDUPTHER

## 2020-11-10 RX ORDER — ASPIRIN 81 MG/1
81 TABLET, CHEWABLE ORAL DAILY
Status: DISCONTINUED | OUTPATIENT
Start: 2020-11-11 | End: 2020-11-11 | Stop reason: HOSPADM

## 2020-11-10 RX ORDER — ACETAMINOPHEN 325 MG/1
650 TABLET ORAL EVERY 4 HOURS PRN
Status: DISCONTINUED | OUTPATIENT
Start: 2020-11-10 | End: 2020-11-11 | Stop reason: HOSPADM

## 2020-11-10 RX ORDER — POLYETHYLENE GLYCOL 3350 17 G/17G
17 POWDER, FOR SOLUTION ORAL DAILY PRN
Status: DISCONTINUED | OUTPATIENT
Start: 2020-11-10 | End: 2020-11-11 | Stop reason: HOSPADM

## 2020-11-10 RX ORDER — SODIUM CHLORIDE 0.9 % (FLUSH) 0.9 %
10 SYRINGE (ML) INJECTION EVERY 12 HOURS SCHEDULED
Status: DISCONTINUED | OUTPATIENT
Start: 2020-11-10 | End: 2020-11-11 | Stop reason: HOSPADM

## 2020-11-10 RX ORDER — ATORVASTATIN CALCIUM 40 MG/1
80 TABLET, FILM COATED ORAL NIGHTLY
Status: DISCONTINUED | OUTPATIENT
Start: 2020-11-10 | End: 2020-11-11 | Stop reason: HOSPADM

## 2020-11-10 RX ORDER — SODIUM CHLORIDE 0.9 % (FLUSH) 0.9 %
10 SYRINGE (ML) INJECTION PRN
Status: DISCONTINUED | OUTPATIENT
Start: 2020-11-10 | End: 2020-11-10 | Stop reason: SDUPTHER

## 2020-11-10 RX ORDER — CLOPIDOGREL BISULFATE 75 MG/1
75 TABLET ORAL DAILY
Status: DISCONTINUED | OUTPATIENT
Start: 2020-11-11 | End: 2020-11-11 | Stop reason: HOSPADM

## 2020-11-10 RX ORDER — ATORVASTATIN CALCIUM 40 MG/1
40 TABLET, FILM COATED ORAL NIGHTLY
Status: DISCONTINUED | OUTPATIENT
Start: 2020-11-10 | End: 2020-11-10 | Stop reason: ALTCHOICE

## 2020-11-10 RX ORDER — SODIUM CHLORIDE 9 MG/ML
INJECTION, SOLUTION INTRAVENOUS CONTINUOUS
Status: DISCONTINUED | OUTPATIENT
Start: 2020-11-10 | End: 2020-11-11

## 2020-11-10 RX ORDER — LUBIPROSTONE 24 UG/1
24 CAPSULE, GELATIN COATED ORAL 2 TIMES DAILY WITH MEALS
Status: DISCONTINUED | OUTPATIENT
Start: 2020-11-10 | End: 2020-11-11 | Stop reason: HOSPADM

## 2020-11-10 RX ORDER — MIDAZOLAM HYDROCHLORIDE 1 MG/ML
2 INJECTION INTRAMUSCULAR; INTRAVENOUS
Status: ACTIVE | OUTPATIENT
Start: 2020-11-10 | End: 2020-11-10

## 2020-11-10 RX ORDER — BUMETANIDE 1 MG/1
1 TABLET ORAL DAILY
Status: DISCONTINUED | OUTPATIENT
Start: 2020-11-10 | End: 2020-11-11 | Stop reason: HOSPADM

## 2020-11-10 RX ORDER — DEXTROSE MONOHYDRATE 50 MG/ML
100 INJECTION, SOLUTION INTRAVENOUS PRN
Status: DISCONTINUED | OUTPATIENT
Start: 2020-11-10 | End: 2020-11-11 | Stop reason: SDUPTHER

## 2020-11-10 RX ORDER — LISINOPRIL 5 MG/1
5 TABLET ORAL 2 TIMES DAILY
Status: DISCONTINUED | OUTPATIENT
Start: 2020-11-10 | End: 2020-11-11

## 2020-11-10 RX ORDER — NICOTINE POLACRILEX 4 MG
15 LOZENGE BUCCAL PRN
Status: DISCONTINUED | OUTPATIENT
Start: 2020-11-10 | End: 2020-11-11 | Stop reason: SDUPTHER

## 2020-11-10 RX ORDER — DEXTROSE MONOHYDRATE 25 G/50ML
12.5 INJECTION, SOLUTION INTRAVENOUS PRN
Status: DISCONTINUED | OUTPATIENT
Start: 2020-11-10 | End: 2020-11-11 | Stop reason: SDUPTHER

## 2020-11-10 RX ORDER — LISINOPRIL 40 MG/1
40 TABLET ORAL DAILY
Status: DISCONTINUED | OUTPATIENT
Start: 2020-11-10 | End: 2020-11-10 | Stop reason: SDUPTHER

## 2020-11-10 RX ORDER — SODIUM BICARBONATE 650 MG/1
650 TABLET ORAL 2 TIMES DAILY
Status: DISCONTINUED | OUTPATIENT
Start: 2020-11-10 | End: 2020-11-11 | Stop reason: HOSPADM

## 2020-11-10 RX ORDER — ASPIRIN 81 MG/1
81 TABLET, CHEWABLE ORAL DAILY
Status: DISCONTINUED | OUTPATIENT
Start: 2020-11-10 | End: 2020-11-10 | Stop reason: SDUPTHER

## 2020-11-10 RX ORDER — SODIUM CHLORIDE 0.9 % (FLUSH) 0.9 %
10 SYRINGE (ML) INJECTION EVERY 12 HOURS SCHEDULED
Status: DISCONTINUED | OUTPATIENT
Start: 2020-11-10 | End: 2020-11-10 | Stop reason: SDUPTHER

## 2020-11-10 RX ORDER — BETHANECHOL CHLORIDE 25 MG/1
25 TABLET ORAL 2 TIMES DAILY
Status: DISCONTINUED | OUTPATIENT
Start: 2020-11-10 | End: 2020-11-11 | Stop reason: HOSPADM

## 2020-11-10 RX ORDER — 0.9 % SODIUM CHLORIDE 0.9 %
500 INTRAVENOUS SOLUTION INTRAVENOUS PRN
Status: DISCONTINUED | OUTPATIENT
Start: 2020-11-10 | End: 2020-11-11 | Stop reason: HOSPADM

## 2020-11-10 RX ORDER — SODIUM CHLORIDE 0.9 % (FLUSH) 0.9 %
10 SYRINGE (ML) INJECTION PRN
Status: DISCONTINUED | OUTPATIENT
Start: 2020-11-10 | End: 2020-11-11 | Stop reason: HOSPADM

## 2020-11-10 RX ORDER — MORPHINE SULFATE 2 MG/ML
2 INJECTION, SOLUTION INTRAMUSCULAR; INTRAVENOUS
Status: DISCONTINUED | OUTPATIENT
Start: 2020-11-10 | End: 2020-11-10 | Stop reason: SDUPTHER

## 2020-11-10 RX ORDER — CETIRIZINE HYDROCHLORIDE 10 MG/1
10 TABLET ORAL DAILY
Status: DISCONTINUED | OUTPATIENT
Start: 2020-11-10 | End: 2020-11-11 | Stop reason: HOSPADM

## 2020-11-10 RX ORDER — VERAPAMIL HYDROCHLORIDE 80 MG/1
40 TABLET ORAL 2 TIMES DAILY
Status: DISCONTINUED | OUTPATIENT
Start: 2020-11-10 | End: 2020-11-11

## 2020-11-10 RX ORDER — CALCIUM CARBONATE 200(500)MG
1000 TABLET,CHEWABLE ORAL DAILY
Status: DISCONTINUED | OUTPATIENT
Start: 2020-11-10 | End: 2020-11-11 | Stop reason: HOSPADM

## 2020-11-10 RX ORDER — CLOPIDOGREL BISULFATE 75 MG/1
75 TABLET ORAL DAILY
Status: DISCONTINUED | OUTPATIENT
Start: 2020-11-10 | End: 2020-11-10 | Stop reason: SDUPTHER

## 2020-11-10 RX ORDER — FENTANYL CITRATE 50 UG/ML
25 INJECTION, SOLUTION INTRAMUSCULAR; INTRAVENOUS
Status: ACTIVE | OUTPATIENT
Start: 2020-11-10 | End: 2020-11-10

## 2020-11-10 RX ORDER — PANTOPRAZOLE SODIUM 40 MG/1
40 TABLET, DELAYED RELEASE ORAL DAILY
Status: DISCONTINUED | OUTPATIENT
Start: 2020-11-10 | End: 2020-11-11 | Stop reason: HOSPADM

## 2020-11-10 RX ORDER — ONDANSETRON 2 MG/ML
4 INJECTION INTRAMUSCULAR; INTRAVENOUS EVERY 4 HOURS PRN
Status: DISCONTINUED | OUTPATIENT
Start: 2020-11-10 | End: 2020-11-11 | Stop reason: HOSPADM

## 2020-11-10 RX ORDER — PILOCARPINE HYDROCHLORIDE 5 MG/1
5 TABLET, FILM COATED ORAL 3 TIMES DAILY
Status: DISCONTINUED | OUTPATIENT
Start: 2020-11-10 | End: 2020-11-11 | Stop reason: HOSPADM

## 2020-11-10 RX ADMIN — BETHANECHOL CHLORIDE 25 MG: 25 TABLET ORAL at 21:45

## 2020-11-10 RX ADMIN — ACETAMINOPHEN 650 MG: 325 TABLET ORAL at 21:54

## 2020-11-10 RX ADMIN — INSULIN LISPRO 3 UNITS: 100 INJECTION, SOLUTION INTRAVENOUS; SUBCUTANEOUS at 21:45

## 2020-11-10 RX ADMIN — IOPAMIDOL 82 ML: 755 INJECTION, SOLUTION INTRAVENOUS at 14:19

## 2020-11-10 RX ADMIN — VERAPAMIL HYDROCHLORIDE 40 MG: 80 TABLET, FILM COATED ORAL at 21:45

## 2020-11-10 RX ADMIN — INSULIN GLARGINE 60 UNITS: 100 INJECTION, SOLUTION SUBCUTANEOUS at 17:42

## 2020-11-10 RX ADMIN — PANTOPRAZOLE SODIUM 40 MG: 40 TABLET, DELAYED RELEASE ORAL at 12:57

## 2020-11-10 RX ADMIN — SODIUM CHLORIDE, PRESERVATIVE FREE 10 ML: 5 INJECTION INTRAVENOUS at 21:07

## 2020-11-10 RX ADMIN — PILOCARPINE HYDROCHLORIDE 5 MG: 5 TABLET, FILM COATED ORAL at 21:45

## 2020-11-10 RX ADMIN — CLOPIDOGREL BISULFATE 75 MG: 75 TABLET ORAL at 12:58

## 2020-11-10 RX ADMIN — ANTACID TABLETS 1000 MG: 500 TABLET, CHEWABLE ORAL at 17:41

## 2020-11-10 RX ADMIN — ASPIRIN 81 MG 81 MG: 81 TABLET ORAL at 12:58

## 2020-11-10 RX ADMIN — TETROFOSMIN 30 MILLICURIE: 1.38 INJECTION, POWDER, LYOPHILIZED, FOR SOLUTION INTRAVENOUS at 09:48

## 2020-11-10 RX ADMIN — ATORVASTATIN CALCIUM 80 MG: 40 TABLET, FILM COATED ORAL at 21:04

## 2020-11-10 RX ADMIN — REGADENOSON 0.4 MG: 0.08 INJECTION, SOLUTION INTRAVENOUS at 09:40

## 2020-11-10 RX ADMIN — LISINOPRIL 5 MG: 5 TABLET ORAL at 21:04

## 2020-11-10 RX ADMIN — TETROFOSMIN 10 MILLICURIE: 1.38 INJECTION, POWDER, LYOPHILIZED, FOR SOLUTION INTRAVENOUS at 07:06

## 2020-11-10 RX ADMIN — SODIUM BICARBONATE 650 MG: 650 TABLET ORAL at 21:04

## 2020-11-10 RX ADMIN — INSULIN LISPRO 6 UNITS: 100 INJECTION, SOLUTION INTRAVENOUS; SUBCUTANEOUS at 17:42

## 2020-11-10 RX ADMIN — CETIRIZINE HYDROCHLORIDE 10 MG: 10 TABLET, FILM COATED ORAL at 17:41

## 2020-11-10 RX ADMIN — SODIUM CHLORIDE: 9 INJECTION, SOLUTION INTRAVENOUS at 19:13

## 2020-11-10 ASSESSMENT — PAIN SCALES - GENERAL
PAINLEVEL_OUTOF10: 0
PAINLEVEL_OUTOF10: 2
PAINLEVEL_OUTOF10: 0
PAINLEVEL_OUTOF10: 0
PAINLEVEL_OUTOF10: 6
PAINLEVEL_OUTOF10: 0

## 2020-11-10 ASSESSMENT — PAIN DESCRIPTION - PROGRESSION
CLINICAL_PROGRESSION: RAPIDLY IMPROVING
CLINICAL_PROGRESSION: RESOLVED

## 2020-11-10 ASSESSMENT — PAIN DESCRIPTION - FREQUENCY
FREQUENCY: INTERMITTENT
FREQUENCY: CONTINUOUS

## 2020-11-10 ASSESSMENT — PAIN DESCRIPTION - ONSET
ONSET: GRADUAL
ONSET: GRADUAL

## 2020-11-10 ASSESSMENT — PAIN - FUNCTIONAL ASSESSMENT
PAIN_FUNCTIONAL_ASSESSMENT: ACTIVITIES ARE NOT PREVENTED
PAIN_FUNCTIONAL_ASSESSMENT: ACTIVITIES ARE NOT PREVENTED

## 2020-11-10 ASSESSMENT — PAIN DESCRIPTION - DESCRIPTORS
DESCRIPTORS: DISCOMFORT
DESCRIPTORS: DISCOMFORT

## 2020-11-10 ASSESSMENT — PAIN DESCRIPTION - PAIN TYPE
TYPE: ACUTE PAIN
TYPE: ACUTE PAIN

## 2020-11-10 ASSESSMENT — PAIN DESCRIPTION - LOCATION
LOCATION: HEAD;NECK
LOCATION: HEAD;NECK

## 2020-11-10 NOTE — H&P
Hospital Medicine  History and Physical    PCP: Haider Willingham MD  Patient Name: Samantha Ram    Date of Service: Pt seen/examined on 11/09/2020 and placed in observation. CHIEF COMPLAINT:  Pt c/o chest discomfort  HISTORY OF PRESENT ILLNESS: Pt is an 72y.o. year-old female with a history of tension, hyperlipidemia, diabetes mellitus, coronary artery disease and Sjogren's disease. Presents to the emergency room following an acute onset of chest pain which began this morning. She states that she was awakened from sleep at 5 AM with severe, crushing pain in her substernal area. Her symptoms lasted for 1 to 2 minutes initially and then resolve spontaneously. Later in the day she had a second episode of chest pain which radiated to her left arm and jaw which resolved spontaneously after approximately 5 minutes. She also reports personal shortness of breath. The emergency room her EKG did not show ischemic changes and her initial troponin was not elevated. She is being admitted for further evaluation and treatment. Associated signs and symptoms do not include typical chest pain, diaphoresis, edema, orthopnea, paroxysmal nocturnal dyspnea, fever or chills.       Past Medical History:        Diagnosis Date    Allergic     Anemia     Angina pectoris (HCC)     Anxiety     Arthritis     CAD (coronary artery disease)     Chronic kidney disease     Colitis     Depression     Frequent headaches     GERD (gastroesophageal reflux disease)     Gout     Heart disease     Heart murmur     Hyperlipidemia     Hypertension     IBS (irritable bowel syndrome)     Lung disease     Migraines     Obesity     Shortness of breath     Sjogren's disease [facility]) September 2015    Dr. Kelsi Baldwin Thyroid disease     hypo when younger    Type 2 diabetes mellitus without complication ([facility] [address]     Type II or unspecified type diabetes mellitus without mention of complication, not stated as uncontrolled     Unspecified cerebral artery occlusion with cerebral infarction     TIA X 2       Past Surgical History:        Procedure Laterality Date    CARDIAC CATHETERIZATION      CORONARY ANGIOPLASTY WITH STENT PLACEMENT  3/27/2014    DILATION AND CURETTAGE OF UTERUS      X2    HYSTERECTOMY      OVARY REMOVAL  1991    SINUS SURGERY      URETHRAL STRICTURE DILATATION         Allergies:  Bee venom; Hydroxychloroquine; Macrodantin [nitrofurantoin]; Quercus robur; Singulair [montelukast sodium]; Sulfa antibiotics; Wasp venom; Ciprofloxacin; and Flagyl [metronidazole]    Medications Prior to Admission:    Prior to Admission medications    Medication Sig Start Date End Date Taking?  Authorizing Provider   clopidogrel (PLAVIX) 75 MG tablet Take 1 tablet by mouth daily 10/22/20   Laurie Garcia MD   nitroGLYCERIN (NITROSTAT) 0.4 MG SL tablet PLACE 1 TABLET UNDER THE TONGUE EVERY 5 MINUTES AS NEEDED FOR CHEST PAIN 9/4/20   Dave Cm MD   fexofenadine (ALLEGRA ALLERGY) 180 MG tablet Take 180 mg by mouth daily    Historical Provider, MD   famotidine (PEPCID) 20 MG tablet Take 20 mg by mouth 2 times daily    Historical Provider, MD   sodium bicarbonate 650 MG tablet Take 650 mg by mouth 3 times daily    Historical Provider, MD   Probiotic Product (PROBIOTIC PO) Take by mouth daily    Historical Provider, MD   atorvastatin (LIPITOR) 80 MG tablet Take 1 tablet by mouth daily 7/24/20   Laurie Garcia MD   metoprolol succinate (TOPROL XL) 25 MG extended release tablet Take 0.5 tablets by mouth daily 5/15/20   Laurie Garcia MD   lubiprostone (AMITIZA) 24 MCG capsule Take 24 mcg by mouth 2 times daily (with meals)    Historical Provider, MD   bethanechol (URECHOLINE) 25 MG tablet Take 25 mg by mouth 2 times daily    Historical Provider, MD   insulin glargine (LANTUS) 100 UNIT/ML injection vial Inject 60 Units into the skin nightly     Historical Provider, MD   verapamil (CALAN-SR) 180 MG CR tablet Take 90 mg by mouth every morning     Historical Provider, MD   Liraglutide (VICTOZA) 18 MG/3ML SOPN SC injection Inject 1.8 mg into the skin daily     Historical Provider, MD   pilocarpine (SALAGEN) 5 MG tablet Take 5 mg by mouth 3 times daily    Historical Provider, MD   bumetanide (BUMEX) 2 MG tablet Take 1 mg by mouth daily     Historical Provider, MD   aspirin 81 MG chewable tablet Take 1 tablet by mouth daily. 3/28/14   Dave Harper MD   dexlansoprazole (DEXILANT) 60 MG CPDR capsule Take 60 mg by mouth daily. Historical Provider, MD   metformin (GLUCOPHAGE) 500 MG tablet Take 500 mg by mouth 2 times daily (with meals)     Historical Provider, MD   FLUTICASONE PROPIONATE, NASAL, NA 2 sprays by Nasal route daily as needed (rhinitis)     Historical Provider, MD   Potassium Chloride (KLOR-CON 10 PO) Take 2 tablets by mouth daily     Historical Provider, MD   lisinopril (PRINIVIL;ZESTRIL) 5 MG tablet Take 5 mg by mouth 2 times daily Take 10 mg in the morning and 5 mg in the evening. Historical Provider, MD   Multiple Vitamins-Minerals (MULTI FOR HER PO) Take 1 tablet by mouth daily. Historical Provider, MD   docusate sodium (STOOL SOFTENER) 100 MG capsule Take 200 mg by mouth 2 times daily 2 tablets    Historical Provider, MD   Calcium Carbonate Antacid (TUMS E-X 750 PO) Take 1 tablet by mouth daily.       Historical Provider, MD       Family History:       Problem Relation Age of Onset    Diabetes Mother     Heart Disease Mother     Osteoarthritis Mother     Cancer Mother         colon    Heart Disease Father     Coronary Art Dis Father     Hypertension Father     Seizures Son     Osteoarthritis Sister     Seizures Other         family history    Diabetes Other         family history    Osteoarthritis Other         family history    Coronary Art Dis Other         family history    Alzheimer's Disease Other         family history    Hypertension Brother     Elevated Lipids Brother     Osteoarthritis Brother  Allergies Brother      Social History:   TOBACCO:   reports that she has never smoked. She has never used smokeless tobacco.  ETOH:   reports no history of alcohol use. OCCUPATION:      REVIEW OF SYSTEMS:  A full review of systems was performed and is negative except for that which appears in the HPI    Physical Exam:    Vitals: BP (!) 146/82   Pulse 91   Temp 98.3 °F (36.8 °C) (Temporal)   Resp 15   Ht 5' 8\" (1.727 m)   Wt 228 lb 13.4 oz (103.8 kg)   SpO2 98%   BMI 34.79 kg/m²   General appearance: WD/WN 72y.o. year-old  female who is alert, appears stated age and is cooperative  HEENT: Head: Normocephalic, no lesions, without obvious abnormality. Eye: Normal external eye, conjunctiva, lids cornea, PEERL. Ears: Normal external ears. Non-tender. Nose: Normal external nose, mucus membranes and septum. Pharynx: Dental Hygiene adequate. Normal buccal mucosa. Normal pharynx. Neck: no adenopathy, no carotid bruit, no JVD, supple, symmetrical, trachea midline and thyroid not enlarged, symmetric, no tenderness/mass/nodules  Lungs: clear to auscultation bilaterally and no use of accessory muscles. Heart: regular rate and rhythm, S1, S2 normal, no murmur, click, rub or gallop and normal apical impulse  Abdomen: soft, non-tender; bowel sounds normal; no masses, no organomegaly  Extremities: extremities atraumatic, no cyanosis or edema and Homans sign is negative, no sign of DVT. Capillary Refill: Acceptable < 3 seconds   Peripheral Pulses: +3 easily felt, not easily obliterated with pressures   Skin: Skin color, texture, turgor normal. No rashes or lesions on exposed skin  Neurologic: Neurovascularly intact without any focal sensory/motor deficits. Cranial nerves: II-XII intact, grossly non-focal. Gait was not tested.   Psychiatric: Alert and oriented, thought content appropriate, normal insight    CBC:   Recent Labs     11/09/20  1615   WBC 7.1   HGB 10.3*        BMP:    Recent Labs 11/09/20  1615   *   K 3.8      CO2 25   BUN 12   CREATININE 1.0   GLUCOSE 228*     Troponin:   Recent Labs     11/09/20  1615   TROPONINI <0.01     PT/INR:  No results found for: PTINR  U/A:    Lab Results   Component Value Date    LEUKOCYTESUR Trace 02/26/2016    RBCUA 3-4 02/26/2016    SPECGRAV 1.015 02/26/2016    UROBILINOGEN Neg 02/26/2016    BILIRUBINUR Neg 02/26/2016    BLOODU Neg 02/26/2016    GLUCOSEU >=500 02/26/2016    PROTEINU Neg 02/26/2016         RAD:   I have independently reviewed and interpreted the imaging studies below and based my recommendations to the patient on those findings. Xr Chest (2 Vw)    Result Date: 11/9/2020  EXAMINATION: TWO XRAY VIEWS OF THE CHEST 11/9/2020 3:58 pm COMPARISON: April 11, 2016 HISTORY: ORDERING SYSTEM PROVIDED HISTORY: Chest Pain TECHNOLOGIST PROVIDED HISTORY: Reason for exam:->Chest Pain Reason for Exam: Chest Pain; Hypertension; Arm Pain Acuity: Acute Type of Exam: Initial FINDINGS: The lungs are without acute focal process. There is no effusion or pneumothorax. The cardiomediastinal silhouette is without acute process. The osseous structures are without acute process. No acute process. Stable compared to prior study         EKG:   Read by ER in the absence of a Cardiologist shows normal sinus rhythm, rate of 99, voltage criteria for LVH, age-indeterminate inferior infarct. Poor R wave progression, age-indeterminate anterior infarct. Rhythm strip shows a sinus rhythm with a rate of 99, SC interval 148 ms, QS 76 ms with no other ectopy as interpreted by me. No significant change compared to EKG dated 8/20/2020. Assessment:   Principal Problem:    Chest pain  Active Problems:    Hyperlipidemia    CAD (coronary artery disease)    DMII (diabetes mellitus, type 2) ([facility])    Essential hypertension    Sjogren's disease ([facility] [address]  Resolved Problems:    * No resolved hospital problems.  *      Plan:       Chest pain - Patient will be admitted and monitored on telemetry, and we will check serial cardiac enzymes. Aspirin was started in the Emergency Room. Cardiac testing has been ordered for risk stratification. I have discussed other possible etiologies of the symptoms such as Musculoskeletal Pain and GERD, including unique presenting characteritics of each. Patient understands that if the evaluation does not show that the symptoms are secondary to ischemic changes, she will be discharged and instructed to follow up with her outpatient physician to help determine the etiology of her symptoms. CAD (coronary artery disease) - As above. Continue Statin, Plavix and Aspirin. Monitor on Telemetry. Hold Beta Blocker until after stress test    Sjogren's disease ([facility] [address] - continue Pilocarpine    Diabetes mellitus II - Lantus, SSI and carb control diet    Essential (primary) hypertension - continue home meds and monitor blood pressure    Hyperlipidemia - No current evidence of Rhabdomyolysis or other adverse effects. Continue statin therapy while in the hospital    Morbid obesity due to excess calories (Body mass index is 34.79 kg/m². ) - Complicating assessment and treatment. Placing patient at risk for multiple co-morbidities as well as early death and contributing to the patient's presentation.  on weight loss when appropriate. DVT Prophylaxis: Lovenox  Diet: Diet NPO Effective Now  Code Status: Prior  (Advanced care planning has been discussed with patient and/or responsible family member and is reflected in the code status.  Further orders associated with this have been entered if appropriate)      Disposition: Anticipate that patient will remain in the hospital for 1 to 2 days depending on further evaluation and clinical course    Please note that over 50 minutes was spent in evaluating the patient, review of records and results, discussion with staff/family, etc.    Carlos Muhammad MD

## 2020-11-10 NOTE — PROGRESS NOTES
[facility] Medicine Progress Note      Admit Date: 11/9/2020       CC: F/U for chest pain     HPI: Pt is an 72y.o. year-old female with a history of tension, hyperlipidemia, diabetes mellitus, coronary artery disease and Sjogren's disease. Presents to the emergency room following an acute onset of chest pain which began this morning. She states that she was awakened from sleep at 5 AM with severe, crushing pain in her substernal area. Her symptoms lasted for 1 to 2 minutes initially and then resolve spontaneously. Later in the day she had a second episode of chest pain which radiated to her left arm and jaw which resolved spontaneously after approximately 5 minutes. She also reports personal shortness of breath. The emergency room her EKG did not show ischemic changes and her initial troponin was not elevated. She is being admitted for further evaluation and treatment. Associated signs and symptoms do not include typical chest pain, diaphoresis, edema, orthopnea, paroxysmal nocturnal dyspnea, fever or chills.       Interval History/Subjective: is pain free at this time. Feels better. Says the stress test was \"rough. \"     Described initial pain left side of chest, left neck and left posterior shoulder as well as in left arm twice yesterday at 5a. m. and 1:30pm at which time she came in. Resolved spontaneously. Lasted approx 3 mins each time. Had stress test and felt sick to her stomach, headache and chest pain during test. Consulted cardiology for finding of defect on stress test and sx during test.  She sees Dr. Claudene Norfolk in [place] for nephrology who recently changed lisinopril to 40mg for continued elevated BP, but states she has had normal BP here. Very scared. Has many questions about meds, why she feels the way she does and wants to know if it is because of her stent she had. States she had a stent around 6 yrs ago. Sees Dr. Hollie Magallon. Dr. Kendra Saavedra consulted for distal inferior hyper ischemia. Angiogram performed today. Review of Systems:       The patient denied headaches, visual changes, LOC, SOB, CP, ABD pain, N/V/D, skin changes, new or worsening weakness or neuromuscular deficits. Comprehensive ROS negative except as mentioned above. Past Medical History:        Diagnosis Date    Allergic     Anemia     Angina pectoris (HCC)     Anxiety     Arthritis     CAD (coronary artery disease)     Chronic kidney disease     Colitis     Depression     Frequent headaches     GERD (gastroesophageal reflux disease)     Gout     Heart disease     Heart murmur     Hyperlipidemia     Hypertension     IBS (irritable bowel syndrome)     Lung disease     Migraines     Obesity     Shortness of breath     Sjogren's disease [facility]) September 2015    Dr. Keila Terrazas Thyroid disease     hypo when younger    Type 2 diabetes mellitus without complication ([facility] [address]     Type II or unspecified type diabetes mellitus without mention of complication, not stated as uncontrolled     Unspecified cerebral artery occlusion with cerebral infarction     TIA X 2       Past Surgical History:        Procedure Laterality Date    CARDIAC CATHETERIZATION      CORONARY ANGIOPLASTY WITH STENT PLACEMENT  3/27/2014    DILATION AND CURETTAGE OF UTERUS      X2    HYSTERECTOMY      OVARY REMOVAL  1991    SINUS SURGERY      URETHRAL STRICTURE DILATATION         Allergies:  Bee venom; Hydroxychloroquine; Macrodantin [nitrofurantoin]; Quercus robur; Singulair [montelukast sodium]; Sulfa antibiotics; Wasp venom; Ciprofloxacin; and Flagyl [metronidazole]    Past medical and surgical history reviewed. Any changes have been noted.      PHYSICAL EXAM:  BP (!) 128/46   Pulse 64   Temp 98.3 °F (36.8 °C) (Temporal)   Resp 16   Ht 5' 8\" (1.727 m)   Wt 228 lb 13.4 oz (103.8 kg)   SpO2 96%   BMI 34.79 kg/m²     No intake or output data in the 24 hours ending 11/10/20 1520     General appearance:   No apparent distress, appears stated age. Cooperative. HEENT:  Normocephalic, atraumatic. PERRLA. EOMi. Conjunctivae/corneas clear, no icterus, non-injected. Neck: Supple, with full range of motion. No jugular venous distention. Trachea midline. Respiratory:  Normal respiratory effort. Clear to auscultation, bilaterally without Rales/Wheezes/Rhonchi. Cardiovascular:  Regular rate and rhythm without murmurs, rubs or gallops. Abdomen: Soft, non-tender, non-distended, without rebound or guarding. Normal bowel sounds. Musculoskeletal:  No clubbing, cyanosis or edema bilaterally. Full range of motion without deformity. Skin: Skin color, texture, turgor normal.  No rashes or lesions. Neurologic:  Neurovascularly intact without any focal sensory/motor deficits. Cranial nerves: II-XII intact, grossly intact. No facial asymmetry, tongue midline. Psychiatric:  Alert and oriented, thought content appropriate  Capillary Refill: Brisk,< 3 seconds   Peripheral Pulses: +2 palpable, equal bilaterally       LABS:    Lab Results   Component Value Date    WBC 6.4 11/10/2020    HGB 10.4 (L) 11/10/2020    HCT 31.5 (L) 11/10/2020    MCV 83.2 11/10/2020     11/10/2020    LABLYMP 2.0 04/06/2017    MID 0.6 04/06/2017    GRAN 4.4 04/06/2017    LYMPHOPCT 35.5 11/10/2020    MIDPERCENT 8.0 04/06/2017    GRANULOCYTES 63.4 04/06/2017    RBC 3.78 (L) 11/10/2020    MCH 27.4 11/10/2020    MCHC 32.9 11/10/2020    RDW 13.7 11/10/2020       Lab Results   Component Value Date    CREATININE 1.0 11/09/2020    BUN 12 11/09/2020     (L) 11/09/2020    K 3.8 11/09/2020     11/09/2020    CO2 25 11/09/2020       No results found for: MG    Lab Results   Component Value Date    ALT 33 11/09/2020    AST 34 11/09/2020    ALKPHOS 80 11/09/2020    BILITOT <0.2 11/09/2020        No flowsheet data found.     Lab Results   Component Value Date    LABA1C 7.2 04/11/2016       Imaging:  NM Cardiac Stress Test Nuclear Imaging   Final Result      XR CHEST (2 VW)   Final Result   No acute process. Stable compared to prior study             Scheduled and prn Medications:    Scheduled Meds:   pantoprazole  40 mg Oral Daily    clopidogrel  75 mg Oral Daily    calcium carbonate  1,000 mg Oral Daily    bumetanide  1 mg Oral Daily    bethanechol  25 mg Oral BID    atorvastatin  80 mg Oral Nightly    aspirin  81 mg Oral Daily    pilocarpine  5 mg Oral TID    lubiprostone  24 mcg Oral BID WC    verapamil  40 mg Oral BID    sodium chloride flush  10 mL Intravenous 2 times per day    atorvastatin  40 mg Oral Nightly    lisinopril  40 mg Oral Daily    [START ON 11/11/2020] aspirin  81 mg Oral Daily    [START ON 11/11/2020] clopidogrel  75 mg Oral Daily    insulin glargine  60 Units Subcutaneous Daily    insulin lispro  0-12 Units Subcutaneous Q4H     Continuous Infusions:   sodium chloride       PRN Meds:.sodium chloride flush, acetaminophen, atropine, morphine, fentanNYL, midazolam, sodium chloride, ondansetron, acetaminophen **OR** acetaminophen    Assessment & Plan:           Chest pain - Patient will be admitted and monitored on telemetry, and we will check serial cardiac enzymes. Aspirin was started in the Emergency Room. Cardiac testing has been ordered for risk stratification. I have discussed other possible etiologies of the symptoms such as Musculoskeletal Pain and GERD, including unique presenting characteritics of each. Patient understands that if the evaluation does not show that the symptoms are secondary to ischemic changes, she will be discharged and instructed to follow up with her outpatient physician to help determine the etiology of her symptoms.      CAD (coronary artery disease) - As above. Continue Statin, Plavix and Aspirin. Monitor on Telemetry.  Hold Beta Blocker until after stress test     Sjogren's disease ([facility] [address] - continue Pilocarpine     Diabetes mellitus II - Lantus, SSI and carb control diet     Essential (primary) hypertension - continue home meds and monitor blood pressure     Hyperlipidemia - No current evidence of Rhabdomyolysis or other adverse effects. Continue statin therapy while in the hospital     Continue current regimen/therapies. Monitor. Adjust medical regimen as appropriate. Body mass index is 34.79 kg/m². The patient and / or the family were informed of the results of any tests, a time was given to answer questions, a plan was proposed and they agreed with plan.       DVT ppx: lovenox      Diet: DIET CARDIAC;    Consults:  IP CONSULT TO HOSPITALIST  IP CONSULT TO CARDIOLOGY  IP CONSULT TO CARDIAC REHAB    DISPO/placement plan: pending    Code Status: Full Code      ROSY Fitzgerald - MARTIN  11/10/20

## 2020-11-10 NOTE — ED NOTES
Pt transported to ER bed #29. Bedside report received from [facility].       Binta Mcgarry RN  11/09/20 5723

## 2020-11-10 NOTE — PROGRESS NOTES
Marisol Hamm       :  1955              MRN:  [id number]      Purpose of Encounter: Advanced care planning in light of chest pain  Parties in attendance: :Genaro Neal MD  Decisional Capacity:Yes    Subjective/Patient Story: Patient understands that her overall function continues to deteriorate. Patient no longer wishes further curative interventions, including hospitalization, if there is no long term benefit :No  Patient wishes to continue further interventions, patient will re-evaluate at a later time: Yes    Objective/Medical Story: Pt is an 72y.o. year-old female with a history of tension, hyperlipidemia, diabetes mellitus, coronary artery disease and Sjogren's disease. Presents to the emergency room following an acute onset of chest pain which began this morning. She states that she was awakened from sleep at 5 AM with severe, crushing pain in her substernal area. Her symptoms lasted for 1 to 2 minutes initially and then resolve spontaneously. Later in the day she had a second episode of chest pain which radiated to her left arm and jaw which resolved spontaneously after approximately 5 minutes. She also reports personal shortness of breath. The emergency room her EKG did not show ischemic changes and her initial troponin was not elevated    Goals of Care Determinations: Patient wishes to focus on treatment and return to home. Plan: Will notify Lidia Fenton MD of change in care plan. Will look at further interventions as needed. Code Status: At this time patient wishes to be Prior    Time Spent on Advanced Planning Documents: 16+ minutes    Advanced Care Planning Documents: Completed advances directives, advanced directives in chart. Electronically signed by Genaro Myers MD   Thank you Lidia Fenton MD for the opportunity to be involved in this patient's care.  If you have any questions or concerns please feel free to contact me at [phone].

## 2020-11-10 NOTE — CONSULTS
[address]  Cardiology Consult Note        CC:      Chest pain            HPI:   This is a 72 y.o. female with a history of coronary disease hypertension who lives in [place] but sees Dr. Greta Guajardo who comes in for evaluation of 2 episodes of left-sided chest pressure associated with left arm numbness. The patient states that she woke up with pain that lasted for 3 minutes in the morning then resolved. It reoccurred again this time lasting 5minutes's. It radiated to left upper neck  and her left arm felt heavy. This heaviness in the arm was similar to prior anginal pain she had. She has a history of stent to the LAD. She also had 50% lesion involving the mid RCA 6 years ago. She underwent Lety myocardial perfusion scan which was read by me showing distal inferior hyper ischemia.            Past Medical History:   Diagnosis Date    Allergic     Anemia     Angina pectoris (HCC)     Anxiety     Arthritis     CAD (coronary artery disease)     Chronic kidney disease     Colitis     Depression     Frequent headaches     GERD (gastroesophageal reflux disease)     Gout     Heart disease     Heart murmur     Hyperlipidemia     Hypertension     IBS (irritable bowel syndrome)     Lung disease     Migraines     Obesity     Shortness of breath     Sjogren's disease [facility]) September 2015    Dr. Mendez Goodness Thyroid disease     hypo when younger    Type 2 diabetes mellitus without complication ([facility] [address]     Type II or unspecified type diabetes mellitus without mention of complication, not stated as uncontrolled     Unspecified cerebral artery occlusion with cerebral infarction     TIA X 2      Past Surgical History:   Procedure Laterality Date    CARDIAC CATHETERIZATION      CORONARY ANGIOPLASTY WITH STENT PLACEMENT  3/27/2014    DILATION AND CURETTAGE OF UTERUS      X2    HYSTERECTOMY      OVARY REMOVAL  1991    SINUS SURGERY      URETHRAL STRICTURE DILATATION        Family History   Problem Relation Age of Onset    Diabetes Mother     Heart Disease Mother     Osteoarthritis Mother     Cancer Mother         colon    Heart Disease Father     Coronary Art Dis Father     Hypertension Father     Seizures Son     Osteoarthritis Sister     Seizures Other         family history    Diabetes Other         family history    Osteoarthritis Other         family history    Coronary Art Dis Other         family history    Alzheimer's Disease Other         family history    Hypertension Brother     Elevated Lipids Brother     Osteoarthritis Brother     Allergies Brother       Social History     Tobacco Use    Smoking status: Never Smoker    Smokeless tobacco: Never Used   Substance Use Topics    Alcohol use: No    Drug use: No     Comment: not reported     Allergies   Allergen Reactions    Bee Venom     Hydroxychloroquine Other (See Comments)     pancreatitis    Macrodantin [Nitrofurantoin]      High fevers    Quercus Robur     Singulair [Montelukast Sodium]      Heart racing    Sulfa Antibiotics      Doesn't remember    Wasp Venom     Ciprofloxacin Nausea And Vomiting    Flagyl [Metronidazole] Nausea And Vomiting      pantoprazole  40 mg Oral Daily    clopidogrel  75 mg Oral Daily    calcium carbonate  1,000 mg Oral Daily    bumetanide  1 mg Oral Daily    bethanechol  25 mg Oral BID    atorvastatin  80 mg Oral Nightly    aspirin  81 mg Oral Daily    pilocarpine  5 mg Oral TID    lubiprostone  24 mcg Oral BID WC    verapamil  40 mg Oral BID    insulin glargine  60 Units Subcutaneous Daily    insulin lispro  0-12 Units Subcutaneous Q4H       Review of Systems -   Constitutional: Negative for weight gain/loss; malaise, fever  Respiratory: Negative for Asthma;  cough and hemoptysis  Cardiovascular: Negative for palpitations,dizziness   Gastrointestinal: Negative for abd.pain; constipation/diarrhea;    Genitourinary: Negative for stones; hematuria; frequency hesitancy  Integumentt: Negative for rash or pruritis  Hematologic/lymphatic: Negative for blood dyscrasia; leukemia/lymphoma  Musculoskeletal: Negative for Connective tissue disease  Neurological:  Negative for Seizure   Behavioral/Psych:Negative for Bipolar disorder, Schizophrenia; Dementia  Endocrine: negative for thyroid, parathyroid disease    No intake or output data in the 24 hours ending 11/10/20 1424    Physical Examination:    BP (!) 128/46   Pulse 64   Temp 98.3 °F (36.8 °C) (Temporal)   Resp 16   Ht 5' 8\" (1.727 m)   Wt 228 lb 13.4 oz (103.8 kg)   SpO2 96%   BMI 34.79 kg/m²    HEENT:  Face: Atraumatic, Conjunctiva: Pink; non icteric,  Mucous Memb:  Moist, No thyromegaly or Lymphadenopathy  Respiratory:  Resp Assessment: normal, Resp Auscultation: clear   Cardiovascular: Auscultation: nl S1 & S2, Palpation:  Nl PMI;  No heaves or thrills, JVP:  normal  Abdomen: Soft, non-tender, Normal bowel sounds,  No organomegaly  Extremities: No Cyanosis or Clubbing; Edema none  Neurological: Oriented to time, place, and person, Non-anxious  Psychiatric: Normal mood and affect  Skin: Warm and dry,  No rash seen      Current Facility-Administered Medications: pantoprazole (PROTONIX) tablet 40 mg, 40 mg, Oral, Daily  clopidogrel (PLAVIX) tablet 75 mg, 75 mg, Oral, Daily  calcium carbonate (TUMS) chewable tablet 1,000 mg, 1,000 mg, Oral, Daily  bumetanide (BUMEX) tablet 1 mg, 1 mg, Oral, Daily  bethanechol (URECHOLINE) tablet 25 mg, 25 mg, Oral, BID  atorvastatin (LIPITOR) tablet 80 mg, 80 mg, Oral, Nightly  aspirin chewable tablet 81 mg, 81 mg, Oral, Daily  pilocarpine (SALAGEN) tablet 5 mg, 5 mg, Oral, TID  lubiprostone (AMITIZA) capsule 24 mcg, 24 mcg, Oral, BID WC  verapamil (CALAN) tablet 40 mg, 40 mg, Oral, BID  insulin glargine (LANTUS) injection vial 60 Units, 60 Units, Subcutaneous, Daily  acetaminophen (TYLENOL) tablet 650 mg, 650 mg, Oral, Q4H PRN **OR** acetaminophen (TYLENOL) suppository 650 mg, 650 mg, Rectal, Q4H PRN  insulin lispro (HUMALOG) injection vial 0-12 Units, 0-12 Units, Subcutaneous, Q4H      Labs:   Recent Labs     11/09/20  1615 11/10/20  0508   WBC 7.1 6.4   HGB 10.3* 10.4*   HCT 30.8* 31.5*    220     Recent Labs     11/09/20  1615   *   K 3.8   CO2 25   BUN 12   CREATININE 1.0   GLUCOSE 228*     No results for input(s): BNP in the last 72 hours. No results for input(s): PROTIME, INR in the last 72 hours. No results for input(s): APTT in the last 72 hours. Recent Labs     11/09/20  1615 11/10/20  0508   TROPONINI <0.01 <0.01     Lab Results   Component Value Date    HDL 34 06/10/2019    HDL 34 04/03/2012    LDLCALC 58 06/10/2019    TRIG 181 04/12/2016     Recent Labs     11/09/20  1615   AST 34   ALT 33   LABALBU 3.7         EKG:   Normal sinus rhythm    Stress Nuclear: 11/10/2020   1. Technically a satisfactory study.     2. Normal pharmacological stress portion of the study.     3. Small sized mild intensity reversible defect suggestive of small area of     ischemia involving the distal inferior wall.     4. Gated Study shows normal LV size and Systolic function; EF is 17%. Corornary angiogram  & Intervention:  1. The right coronary artery arises from the right sinus of Valsalva. It is  a moderate-to-large size artery and it has mild disease in the proximal  segment. There appears to be about 50% stenosis in the mid to distal segment  of the right coronary artery but it does not appear hemodynamically  significant. 2.  The left main trunk arises from the left sinus of Valsalva. It divides  into left anterior descending artery, left circumflex artery and probably  into a high obtuse marginal or the ramus branch. The left main trunk is free  of atherosclerosis. 3.  The left anterior descending artery is calcified in the proximal segment  and there appears to be a 60% to 70% stenosis of the LAD right after the  diagonal branch.   The remainder of the left anterior descending artery  appears to be free of atherosclerosis. 4.  The left circumflex artery arises from the left main trunk. It is free  of atherosclerosis. There is a high obtuse marginal or a ramus branch which  also appears to be free of atherosclerosis    Successful angioplasty followed by Drug-Eluting stent deployment in the  mid left anterior descending 70% stenosis reduced to 0% with a final diameter  of 3.0 using a Drug-Eluting stent. ASSESSMENT AND PLAN:      54-year-old patient with known coronary disease hypertension presenting with left arm pain and chest pain similar to the one that she had 6 years ago. Lexiscan-myocardial perfusion scan showed small area of ischemia in the involving the distal inferior wall  Patient is really nervous and wants definitive answers  We will therefore proceed with coronary geography and possible PCI  Procedure and risk explained to patient who understands and wishes to proceed        Tricia Favre M. D  11/10/2020

## 2020-11-10 NOTE — ED NOTES
Bed: C-29  Expected date:   Expected time:   Means of arrival:   Comments:  Room [address] [place] [address]  11/09/20 1046

## 2020-11-10 NOTE — ED PROVIDER NOTES
Attending Supervisory Note/Shared Visit   I have personally performed a face to face diagnostic evaluation on this patient. I have reviewed the mid-levels findings and agree. History and Exam by me shows alert white female no acute distress. She states recently her blood pressure has been trending high. She contacted her kidney specialist and they increased her lisinopril. This morning she woke up from her sleep and had an episode of chest pain that lasted 2 or 3 minutes. It was left-sided, pressure-like. Again this afternoon around 130 while she was at rest she developed left-sided pressure-like chest discomfort. It radiated into her left arm, left shoulder, and left neck. She states it felt like somebody was squeezing her arm. She did not feel short of breath at the time. She states she later had some difficulty breathing when she was walking. Neck: Supple without adenopathy or JVD. No thyromegaly. Heart: Regular rate and rhythm. No murmurs or gallops noted. Lungs: Breath sounds equal bilaterally and clear. Abdomen: Obese, soft, nontender. Musculoskeletal: No lower extremity edema. No calf tenderness. EKG: Normal sinus rhythm, rate of 99, voltage criteria for LVH, age-indeterminate inferior infarct. Poor R wave progression, age-indeterminate anterior infarct. Rhythm strip shows a sinus rhythm with a rate of 99, IN interval 148 ms, QS 76 ms with no other ectopy as interpreted by me. No significant change compared to EKG dated 8/20/2020. Lab reviewed. H&H are 10.3 and 30.8. White blood cell count 7100 with 63 neutrophils and 25 lymphs. Troponin less than 0.01. Sodium 135 with a potassium of 3.8. Glucose of 228. Troponin less than 0.01. Chest x-ray: No acute cardiopulmonary abnormality. Old records were reviewed. She had a nuclear stress test done in June 2019 which was unremarkable. Her heart score is 7. Her symptoms are suspicious for cardiac chest pain.   She has chest

## 2020-11-10 NOTE — PRE SEDATION
Sedation Pre-Procedure Note    Patient Name: Fatuma Concepcion   YOB: 1955  Room/Bed: CATH/NONE  Medical Record Number: [id number]  Date: 11/10/2020   Time: 2:30 PM       Indication:  Unstable angina    Consent: I have discussed with the patient and/or the patient representative the indication, alternatives, and the possible risks and/or complications of the planned procedure and the anesthesia methods. The patient and/or patient representative appear to understand and agree to proceed. Vital Signs:   Vitals:    11/10/20 0725   BP:    Pulse: 64   Resp: 16   Temp:    SpO2: 96%       Past Medical History:   has a past medical history of Allergic, Anemia, Angina pectoris (HCC), Anxiety, Arthritis, CAD (coronary artery disease), Chronic kidney disease, Colitis, Depression, Frequent headaches, GERD (gastroesophageal reflux disease), Gout, Heart disease, Heart murmur, Hyperlipidemia, Hypertension, IBS (irritable bowel syndrome), Lung disease, Migraines, Obesity, Shortness of breath, Sjogren's disease ([address] Thyroid disease, Type 2 diabetes mellitus without complication ([address] Type II or unspecified type diabetes mellitus without mention of complication, not stated as uncontrolled, and Unspecified cerebral artery occlusion with cerebral infarction. Past Surgical History:   has a past surgical history that includes Hysterectomy; Cardiac catheterization; Dilation and curettage of uterus; sinus surgery; Urethra dilation; Coronary angioplasty with stent (3/27/2014); and Ovary removal (1991).     Medications:   Scheduled Meds:    pantoprazole  40 mg Oral Daily    clopidogrel  75 mg Oral Daily    calcium carbonate  1,000 mg Oral Daily    bumetanide  1 mg Oral Daily    bethanechol  25 mg Oral BID    atorvastatin  80 mg Oral Nightly    aspirin  81 mg Oral Daily    pilocarpine  5 mg Oral TID    lubiprostone  24 mcg Oral BID WC    verapamil  40 mg Oral BID    insulin glargine  60 Units Subcutaneous Daily  insulin lispro  0-12 Units Subcutaneous Q4H     Continuous Infusions:   PRN Meds: acetaminophen **OR** acetaminophen  Home Meds:   Prior to Admission medications    Medication Sig Start Date End Date Taking?  Authorizing Provider   metFORMIN (GLUCOPHAGE-XR) 500 MG extended release tablet Take 500 mg by mouth 2 times daily   Yes Historical Provider, MD   magnesium oxide (MAG-OX) 400 MG tablet Take 400 mg by mouth daily   Yes Historical Provider, MD   clopidogrel (PLAVIX) 75 MG tablet Take 1 tablet by mouth daily 10/22/20  Yes Prashanth Roe MD   nitroGLYCERIN (NITROSTAT) 0.4 MG SL tablet PLACE 1 TABLET UNDER THE TONGUE EVERY 5 MINUTES AS NEEDED FOR CHEST PAIN 9/4/20  Yes Prashanth Roe MD   fexofenadine (ALLEGRA ALLERGY) 180 MG tablet Take 180 mg by mouth daily   Yes Historical Provider, MD   famotidine (PEPCID) 20 MG tablet Take 20 mg by mouth 2 times daily   Yes Historical Provider, MD   sodium bicarbonate 650 MG tablet Take 650 mg by mouth 2 times daily    Yes Historical Provider, MD   Probiotic Product (PROBIOTIC PO) Take by mouth daily   Yes Historical Provider, MD   atorvastatin (LIPITOR) 80 MG tablet Take 1 tablet by mouth daily 7/24/20  Yes Prashatnh Roe MD   metoprolol succinate (TOPROL XL) 25 MG extended release tablet Take 0.5 tablets by mouth daily 5/15/20  Yes Prashanth Roe MD   lubiprostone (AMITIZA) 24 MCG capsule Take 24 mcg by mouth 2 times daily (with meals)   Yes Historical Provider, MD   bethanechol (URECHOLINE) 25 MG tablet Take 25 mg by mouth 2 times daily   Yes Historical Provider, MD   insulin glargine (LANTUS) 100 UNIT/ML injection vial Inject 70 Units into the skin daily    Yes Historical Provider, MD   verapamil (CALAN-SR) 180 MG CR tablet Take 90 mg by mouth every morning    Yes Historical Provider, MD   Liraglutide (VICTOZA) 18 MG/3ML SOPN SC injection Inject 1.8 mg into the skin Daily with supper    Yes Historical Provider, MD   pilocarpine (SALAGEN) 5 MG tablet Take 5 mg by mouth 3 times daily   Yes Historical Provider, MD   bumetanide (BUMEX) 2 MG tablet Take 1 mg by mouth daily    Yes Historical Provider, MD   aspirin 81 MG chewable tablet Take 1 tablet by mouth daily. 3/28/14  Yes Barbara Delcid MD   dexlansoprazole (DEXILANT) 60 MG CPDR capsule Take 60 mg by mouth daily. Yes Historical Provider, MD   FLUTICASONE PROPIONATE, NASAL, NA 2 sprays by Nasal route daily as needed (rhinitis)    Yes Historical Provider, MD   potassium chloride (KLOR-CON 10) 10 MEQ extended release tablet Take 2 tablets by mouth daily    Yes Historical Provider, MD   lisinopril (PRINIVIL;ZESTRIL) 20 MG tablet Take 20 mg by mouth 2 times daily    Yes Historical Provider, MD   Multiple Vitamins-Minerals (MULTI FOR HER PO) Take 1 tablet by mouth daily. Yes Historical Provider, MD   docusate sodium (STOOL SOFTENER) 100 MG capsule Take 200 mg by mouth 2 times daily 2 tablets   Yes Historical Provider, MD   Calcium Carbonate Antacid (TUMS E-X 750 PO) Take 1 tablet by mouth as needed    Yes Historical Provider, MD     Coumadin Use Last 7 Days:  no  Antiplatelet drug therapy use last 7 days: yes - asa and plavix  Other anticoagulant use last 7 days: no  Additional Medication Information:        Pre-Sedation Documentation and Exam:   I have personally completed a history, physical exam & review of systems for this patient (see notes).     Mallampati Airway Assessment:  normal    Prior History of Anesthesia Complications:   none    ASA Classification:  Class 2 - A normal healthy patient with mild systemic disease    Sedation/ Anesthesia Plan:   intravenous sedation    Medications Planned:   midazolam (Versed) intravenously and fentanyl intravenously    Patient is an appropriate candidate for plan of sedation: yes    Electronically signed by Hollie Patel MD on 11/10/2020 at 2:30 PM

## 2020-11-10 NOTE — ED PROVIDER NOTES
[address] [facility]        Pt Name: Loyd Gilmore  MRN: [id number]  [place] 1955  Date of evaluation: 11/9/2020  Provider: ROSY Gloria - CNP  PCP: Lidia Fenton MD     I have seen and evaluated this patient with my supervising physician Marcial Begum MD.    [address] [place] [address]       Chief Complaint   Patient presents with    Chest Pain    Hypertension    Arm Pain     left. HISTORY OF PRESENT ILLNESS   (Location, Timing/Onset, Context/Setting, Quality, Duration, Modifying Factors, Severity, Associated Signs and Symptoms)  Note limiting factors. Loyd Gilmore is a 72 y.o. female with PMH significant for CAD anticoagulated on Plavix, CKD, HLD, HTN, IDDM, TIA, Sjogren's disease, GERD, IBS, thyroid disease who presents to the emergency department today complaining of chest pain, shortness of breath, and HTN. She advised that she had a episode of crushing midsternal chest pain that woke her up out of her sleep around 5:00 this morning. It only lasted for a minute or so and then resolved spontaneously. She had another episode earlier this afternoon that lasted for about 5 minutes and radiated to her arm and left jaw. Shortness of breath is exertional.  No fever cough. NM stress test 6/2019 which showed no ischemia. Nursing Notes were all reviewed and agreed with or any disagreements were addressed in the HPI. REVIEW OF SYSTEMS    (2-9 systems for level 4, 10 or more for level 5)     Review of Systems   Constitutional: Negative for chills, diaphoresis and fever. HENT: Negative. Respiratory: Positive for shortness of breath. Negative for cough. Cardiovascular: Positive for chest pain. Negative for leg swelling. Gastrointestinal: Negative for abdominal distention, abdominal pain, constipation, diarrhea, nausea and vomiting.    Genitourinary: Negative for dysuria, flank pain, frequency and hematuria. Musculoskeletal: Negative for back pain, neck pain and neck stiffness. Skin: Negative for color change, pallor and rash. Allergic/Immunologic: Negative for immunocompromised state. Neurological: Negative for dizziness, syncope, weakness, light-headedness, numbness and headaches. Hematological: Negative for adenopathy. Psychiatric/Behavioral: Negative for confusion. All other systems reviewed and are negative. Positives and Pertinent negatives as per HPI. Except as noted above in the ROS, all other systems were reviewed and negative. PAST MEDICAL HISTORY     Past Medical History:   Diagnosis Date    Allergic     Anemia     Angina pectoris (HCC)     Anxiety     Arthritis     CAD (coronary artery disease)     Chronic kidney disease     Colitis     Depression     Frequent headaches     GERD (gastroesophageal reflux disease)     Gout     Heart disease     Heart murmur     Hyperlipidemia     Hypertension     IBS (irritable bowel syndrome)     Lung disease     Migraines     Obesity     Shortness of breath     Sjogren's disease [facility]) September 2015    Dr. Glenda Parisi Thyroid disease     hypo when younger    Type 2 diabetes mellitus without complication ([facility] [address]     Type II or unspecified type diabetes mellitus without mention of complication, not stated as uncontrolled     Unspecified cerebral artery occlusion with cerebral infarction     TIA X 2         SURGICAL HISTORY     Past Surgical History:   Procedure Laterality Date    CARDIAC CATHETERIZATION      CORONARY ANGIOPLASTY WITH STENT PLACEMENT  3/27/2014    DILATION AND CURETTAGE OF UTERUS      X2    HYSTERECTOMY      OVARY REMOVAL  1991    SINUS SURGERY      URETHRAL STRICTURE DILATATION           CURRENTMEDICATIONS       Previous Medications    ASPIRIN 81 MG CHEWABLE TABLET    Take 1 tablet by mouth daily.     ATORVASTATIN (LIPITOR) 80 MG TABLET    Take 1 tablet by mouth daily    BETHANECHOL (URECHOLINE) 25 MG TABLET    Take 25 mg by mouth 2 times daily    BUMETANIDE (BUMEX) 2 MG TABLET    Take 1 mg by mouth daily     CALCIUM CARBONATE ANTACID (TUMS E-X 750 PO)    Take 1 tablet by mouth daily. CLOPIDOGREL (PLAVIX) 75 MG TABLET    Take 1 tablet by mouth daily    DEXLANSOPRAZOLE (DEXILANT) 60 MG CPDR CAPSULE    Take 60 mg by mouth daily. DOCUSATE SODIUM (STOOL SOFTENER) 100 MG CAPSULE    Take 200 mg by mouth 2 times daily 2 tablets    FAMOTIDINE (PEPCID) 20 MG TABLET    Take 20 mg by mouth 2 times daily    FEXOFENADINE (ALLEGRA ALLERGY) 180 MG TABLET    Take 180 mg by mouth daily    FLUTICASONE PROPIONATE, NASAL, NA    2 sprays by Nasal route daily as needed (rhinitis)     INSULIN GLARGINE (LANTUS) 100 UNIT/ML INJECTION VIAL    Inject 60 Units into the skin nightly     LIRAGLUTIDE (VICTOZA) 18 MG/3ML SOPN SC INJECTION    Inject 1.8 mg into the skin daily     LISINOPRIL (PRINIVIL;ZESTRIL) 5 MG TABLET    Take 5 mg by mouth 2 times daily Take 10 mg in the morning and 5 mg in the evening. LUBIPROSTONE (AMITIZA) 24 MCG CAPSULE    Take 24 mcg by mouth 2 times daily (with meals)    LUBIPROSTONE (AMITIZA) 24 MCG CAPSULE    Take 24 mcg by mouth 2 times daily (with meals)    METFORMIN (GLUCOPHAGE) 500 MG TABLET    Take 500 mg by mouth 2 times daily (with meals)     METOPROLOL SUCCINATE (TOPROL XL) 25 MG EXTENDED RELEASE TABLET    Take 0.5 tablets by mouth daily    MULTIPLE VITAMINS-MINERALS (MULTI FOR HER PO)    Take 1 tablet by mouth daily.       NITROGLYCERIN (NITROSTAT) 0.4 MG SL TABLET    PLACE 1 TABLET UNDER THE TONGUE EVERY 5 MINUTES AS NEEDED FOR CHEST PAIN    PILOCARPINE (SALAGEN) 5 MG TABLET    Take 5 mg by mouth 3 times daily    POTASSIUM CHLORIDE (KLOR-CON 10 PO)    Take 2 tablets by mouth daily     PROBIOTIC PRODUCT (PROBIOTIC PO)    Take by mouth daily    SODIUM BICARBONATE 650 MG TABLET    Take 650 mg by mouth 3 times daily    VERAPAMIL (CALAN-SR) 180 MG CR TABLET    Take 90 mg by mouth every morning ALLERGIES     Bee venom; Hydroxychloroquine; Macrodantin [nitrofurantoin]; Quercus robur; Singulair [montelukast sodium]; Sulfa antibiotics; Wasp venom; Ciprofloxacin; and Flagyl [metronidazole]    FAMILYHISTORY       Family History   Problem Relation Age of Onset    Diabetes Mother     Heart Disease Mother     Osteoarthritis Mother     Cancer Mother         colon    Heart Disease Father     Coronary Art Dis Father     Hypertension Father     Seizures Son     Osteoarthritis Sister     Seizures Other         family history    Diabetes Other         family history    Osteoarthritis Other         family history    Coronary Art Dis Other         family history    Alzheimer's Disease Other         family history    Hypertension Brother     Elevated Lipids Brother     Osteoarthritis Brother     Allergies Brother           SOCIAL HISTORY       Social History     Tobacco Use    Smoking status: Never Smoker    Smokeless tobacco: Never Used   Substance Use Topics    Alcohol use: No     Comment: not reorted    Drug use: No     Comment: not reported       SCREENINGS      Heart Score for chest pain patients  History: Highly Suspicious  ECG: Non-Specifc repolarization disturbance/LBTB/PM  Patient Age: > 65 years  *Risk factors for Atherosclerotic disease: Hypertension, Obesity, Diabetes Mellitus, Coronary Artery Disease  Risk Factors: > 3 Risk factors or history of atherosclerotic disease*  Troponin: < 1X normal limit  Heart Score Total: 7      PHYSICAL EXAM    (up to 7 for level 4, 8 or more for level 5)     ED Triage Vitals [11/09/20 1556]   BP Temp Temp Source Pulse Resp SpO2 Height Weight   (!) 150/89 98.3 °F (36.8 °C) Temporal 98 16 98 % 5' 8\" (1.727 m) 228 lb 13.4 oz (103.8 kg)       Physical Exam  Vitals signs and nursing note reviewed. Constitutional:       General: She is not in acute distress. Appearance: Normal appearance. She is well-developed. She is not toxic-appearing.    HENT: Head: Normocephalic and atraumatic. Eyes:      General: No scleral icterus. Conjunctiva/sclera: Conjunctivae normal.   Neck:      Musculoskeletal: Full passive range of motion without pain and neck supple. No neck rigidity. Vascular: No JVD. Cardiovascular:      Rate and Rhythm: Normal rate and regular rhythm. Heart sounds: Normal heart sounds. Pulmonary:      Effort: Pulmonary effort is normal. No respiratory distress. Breath sounds: Normal breath sounds. Abdominal:      General: There is no distension. Palpations: Abdomen is soft. Abdomen is not rigid. Tenderness: There is no abdominal tenderness. Musculoskeletal: Normal range of motion. Skin:     General: Skin is warm and dry. Findings: No rash. Neurological:      General: No focal deficit present. Mental Status: She is alert and oriented to person, place, and time.    Psychiatric:         Mood and Affect: Mood normal.         DIAGNOSTIC RESULTS   LABS:    Labs Reviewed   CBC WITH AUTO DIFFERENTIAL - Abnormal; Notable for the following components:       Result Value    RBC 3.77 (*)     Hemoglobin 10.3 (*)     Hematocrit 30.8 (*)     All other components within normal limits    Narrative:     Performed at:  [address] [place]   Phone [phone]   COMPREHENSIVE METABOLIC PANEL W/ REFLEX TO MG FOR LOW K - Abnormal; Notable for the following components:    Sodium 135 (*)     Glucose 228 (*)     GFR Non- 56 (*)     All other components within normal limits    Narrative:     Performed at:  [address] [facility] [employer] [address]   Phone [phone]   TROPONIN    Narrative:     Performed at:  [address] [employer] [address]   Phone [phone]       All other labs were within normal range or not returned as of this dictation. EKG: All EKG's are interpreted by the Emergency Department Physician in the absence of a cardiologist.  Please see their note for interpretation of EKG. RADIOLOGY:   Non-plain film images such as CT, Ultrasound and MRI are read by the radiologist. Plain radiographic images are visualized and preliminarily interpreted by the ED Provider with the below findings:        Interpretation per the Radiologist below, if available at the time of this note:    XR CHEST (2 VW)   Final Result   No acute process. Stable compared to prior study           Xr Chest (2 Vw)    Result Date: 11/9/2020  EXAMINATION: TWO XRAY VIEWS OF THE CHEST 11/9/2020 3:58 pm COMPARISON: April 11, 2016 HISTORY: ORDERING SYSTEM PROVIDED HISTORY: Chest Pain TECHNOLOGIST PROVIDED HISTORY: Reason for exam:->Chest Pain Reason for Exam: Chest Pain; Hypertension; Arm Pain Acuity: Acute Type of Exam: Initial FINDINGS: The lungs are without acute focal process. There is no effusion or pneumothorax. The cardiomediastinal silhouette is without acute process. The osseous structures are without acute process. No acute process. Stable compared to prior study           PROCEDURES   Unless otherwise noted below, none     Procedures    CRITICAL CARE TIME   CRITICAL CARE NOTE:  There was a high probability of clinically significant life-threatening deterioration of the patient's condition requiring my urgent intervention. Total critical care time was at least 32 minutes. This includes vital sign monitoring, pulse oximetry monitoring, telemetry monitoring, clinical response to the IV medications, reviewing the nursing notes, consultation time, dictation/documentation time, and interpretation of the labwork. This excludes any separately billable procedures performed.       CONSULTS:  IP CONSULT TO HOSPITALIST      EMERGENCY DEPARTMENT COURSE and DIFFERENTIAL DIAGNOSIS/MDM:   Vitals:    Vitals:    11/09/20 1556   BP: (!) 150/89   Pulse: 98

## 2020-11-10 NOTE — DISCHARGE SUMMARY
dyspnea, fever or chills. EKG:   Read by ER in the absence of a Cardiologist shows normal sinus rhythm, rate of 99, voltage criteria for LVH, age-indeterminate inferior infarct.  Poor R wave progression, age-indeterminate anterior infarct.  Rhythm strip shows a sinus rhythm with a rate of 99, AR interval 148 ms, QS 76 ms with no other ectopy as interpreted by ER provider.  No significant change compared to EKG dated 8/20/2020.       Patient was admitted for chest pain, placed on telemetry and we will check serial cardiac enzymes. Aspirin was started in the Emergency Room. Cardiac testing has been ordered for risk stratification. I have discussed other possible etiologies of the symptoms such as Musculoskeletal Pain and GERD, including unique presenting characteritics of each. Patient understands that if the evaluation does not show that the symptoms are secondary to ischemic changes, she will be discharged and instructed to follow up with her outpatient physician to help determine the etiology of her symptoms. We consulted cardiology and patient was seen by Dr. Christian Rivera for continued chest pain on the left side with radiation to left neck and left arm. She had history of CAD with prior stent to LAD 6 yrs ago. There was ischemia involving distal inferior wall, leading to diagnostic angiography yesterday showing high grade 80% lesion in mid distal RCA and was stented. She should take metoprolol 50mg daily, statin 80mg nightly lisinopril 20mg bid on discharge. She is medically ready for discharge             CAD (coronary artery disease) - As above. Continue Statin, Plavix and Aspirin. Monitor on Telemetry.  Hold Beta Blocker until after stress test     Sjogren's disease ([facility] [address] - continue [place]ine     Diabetes mellitus II - Lantus, SSI and carb control diet     Essential (primary) hypertension - continue home meds and monitor blood pressure     Hyperlipidemia - No current evidence of Rhabdomyolysis or WBC 5.5 11/11/2020    HGB 9.4 11/11/2020    HCT 28.2 11/11/2020     11/11/2020       Renal:    Lab Results   Component Value Date     11/11/2020    K 3.5 11/11/2020     11/11/2020    CO2 25 11/11/2020    BUN 18 11/11/2020    CREATININE 1.3 11/11/2020    CALCIUM 8.8 11/11/2020       Discharge Medications:     Current Discharge Medication List           Details   spironolactone (ALDACTONE) 25 MG tablet Take 1 tablet by mouth daily  Qty: 30 tablet, Refills: 3              Details   !! atorvastatin (LIPITOR) 80 MG tablet Take 1 tablet by mouth nightly  Qty: 30 tablet, Refills: 3      metoprolol succinate (TOPROL XL) 50 MG extended release tablet Take 1 tablet by mouth daily  Qty: 30 tablet, Refills: 3       !! - Potential duplicate medications found. Please discuss with provider.            Details   metFORMIN (GLUCOPHAGE-XR) 500 MG extended release tablet Take 500 mg by mouth 2 times daily      magnesium oxide (MAG-OX) 400 MG tablet Take 400 mg by mouth daily      clopidogrel (PLAVIX) 75 MG tablet Take 1 tablet by mouth daily  Qty: 90 tablet, Refills: 4      nitroGLYCERIN (NITROSTAT) 0.4 MG SL tablet PLACE 1 TABLET UNDER THE TONGUE EVERY 5 MINUTES AS NEEDED FOR CHEST PAIN  Qty: 25 tablet, Refills: 6      fexofenadine (ALLEGRA ALLERGY) 180 MG tablet Take 180 mg by mouth daily      famotidine (PEPCID) 20 MG tablet Take 20 mg by mouth 2 times daily      sodium bicarbonate 650 MG tablet Take 650 mg by mouth 2 times daily       Probiotic Product (PROBIOTIC PO) Take by mouth daily      !! atorvastatin (LIPITOR) 80 MG tablet Take 1 tablet by mouth daily  Qty: 90 tablet, Refills: 4      lubiprostone (AMITIZA) 24 MCG capsule Take 24 mcg by mouth 2 times daily (with meals)      bethanechol (URECHOLINE) 25 MG tablet Take 25 mg by mouth 2 times daily      insulin glargine (LANTUS) 100 UNIT/ML injection vial Inject 70 Units into the skin daily       verapamil (CALAN-SR) 180 MG CR tablet Take 90 mg by mouth every

## 2020-11-10 NOTE — ED NOTES
Pt placed on/continued on cardiac monitor and continuous pulse ox - see flowsheet     Sedrick Espinoza RN  11/09/20 2025

## 2020-11-10 NOTE — ED NOTES
RN spoke with pharmacist Brandt Neff regarding pts night meds. Per pharmacist Brandt Neff give small scale Humalog tonight. Per pt she take Lantus once daily in morning. Pt made aware that she is NPO after midnight.       Fitz Cade RN  11/10/20 0004

## 2020-11-10 NOTE — PROGRESS NOTES
Pharmacy Medication Reconciliation Note     List of medications patient is currently taking is complete. Source of information:   1. Patient with medication list  2. EMR    Notes regarding home medications:   1. Patient received all of her morning home medication doses before presenting to the ER.       [id number] [facility] Intern  11/9/2020 9:45 PM

## 2020-11-10 NOTE — ED NOTES
RN at bedside pt ambulated to restroom with a steady gait. No concerns voiced at this time.       Alla Mae RN  11/10/20 [id number]

## 2020-11-10 NOTE — ED TRIAGE NOTES
Two episodes of acute chest pain in last 24 hours. Hypertension continues acutely despite increase in anti-htn medications through PCP and cardiologist. Patient with significant medical hx including cardiac with stent placements. A/O x4. Hypertensive but other VSS.

## 2020-11-10 NOTE — ED NOTES
RN at bedside pt given IPhone  to use and updated on plan of care.       Mau Garza RN  11/09/20 6053

## 2020-11-10 NOTE — ED NOTES
Bed: C-18  Expected date:   Expected time:   Means of arrival:   Comments:  Bed 8[address]  11/09/20 2854

## 2020-11-10 NOTE — PROCEDURES
[address] [place] [address]                            CARDIAC CATHETERIZATION    PATIENT NAME: Trevor Salamanca                    :        1955  MED REC NO:   [id number]                          ROOM:       029  ACCOUNT NO:   [de-identified]                           ADMIT DATE: 2020  PROVIDER:     Naeem Tinoco MD    DATE OF PROCEDURE:  11/10/2020    REFERRING PROVIDER:  Dr. Greta Guajardo. INDICATION FOR PROCEDURE:  The patient is a 51-year-old lady with a  history of hypertension and coronary artery disease. She is status post  stent to the LAD in . She presents with two episodes of chest pain  occurring at rest.    She describes the pain as a pressure radiating to her upper left side of  the neck and then left arm feeling heavy. This was similar to her  presentation six years ago. She also underwent a stress nuclear scan which showed a small area of  ischemia involving the distal inferior wall. PROCEDURES PERFORMED:  1. Left heart catheterization, coronary angiogram, LV angiogram  performed to the right radial artery. 2.  PCI and stenting of the mid distal RCA. DIAGNOSTIC ANGIOGRAM:  Catheters used are JL3.5, JR4, and a pigtail  catheter. HEMODYNAMICS:  Aortic pressure was 140/80. Left ventricular pressure  was 140/15 mmHg. There was no gradient across the aortic valve. LV angiogram performed in the COLEMAN 30 degrees projection shows normal  left ventricular size with ejection fraction of 60%. No wall motion  abnormalities. CORONARY ANGIOGRAM:  1. There is single-vessel disease in this right dominant circulation. 2.  The left main is free of disease. 3.  The LAD in the proximal segment has a stent which is widely open. The second diagonal branch has an 80% ostial lesion but it appears to be  quite stable with good flow.   4.  The LCx artery gives rise to two OM branches, the first  one appears to have may be a 20-30% proximal lesion. The ramus branch  has a 30% ostial lesion as well. 5.  The RCA is a dominant vessel. In the mid segment,  there is a 50% lesion. In the mid distal segment, there is an 80%  lesion. This is probably the culprit vessel. CONCLUSIONS:  1. Single-vessel coronary artery disease with 80% mid distal RCA and  50% mid LAD. 2.  Minor obstruction of the arteries and branches. 3.  Normal left ventricular size and systolic function. 4.  Elevated left heart filling pressures. INTERVENTIONAL PROCEDURE:  Catheters used are JR4 guide with side holes,  Runthrough wire, a 2.5 x 15 mm KRISTI stent was used. TECHNICAL COMMENTS:  The guide provided somewhat poor support for the  intervention procedure. However, the lesion was crossed without much  difficulty. I had some difficulty getting the stent across because of  the poor guide support but I was able to deliver it and dilated it up to  14 atmospheres which corresponds to a vessel size of approximately 2.7. Followup angiography shows 0% residual.    CONCLUSION:  Successful PCI and stenting of the mid distal RCA lesion  reduced from 80% to 0%.     EBL: Less than 30 cc    JAYDE ARCOS MD    D: 11/10/2020 14:53:35       T: 11/10/2020 17:58:12     [id number]  Job#: [id number]     Doc#: [id number]    CC:  Man Bergeron MD

## 2020-11-11 VITALS
OXYGEN SATURATION: 99 % | DIASTOLIC BLOOD PRESSURE: 75 MMHG | BODY MASS INDEX: 34.05 KG/M2 | HEART RATE: 76 BPM | RESPIRATION RATE: 16 BRPM | WEIGHT: 224.65 LBS | SYSTOLIC BLOOD PRESSURE: 143 MMHG | TEMPERATURE: 98 F | HEIGHT: 68 IN

## 2020-11-11 LAB
ANION GAP SERPL CALCULATED.3IONS-SCNC: 9 MMOL/L (ref 3–16)
BASOPHILS ABSOLUTE: 0 K/UL (ref 0–0.2)
BASOPHILS RELATIVE PERCENT: 0.5 %
BUN BLDV-MCNC: 18 MG/DL (ref 7–20)
CALCIUM SERPL-MCNC: 8.8 MG/DL (ref 8.3–10.6)
CHLORIDE BLD-SCNC: 102 MMOL/L (ref 99–110)
CO2: 25 MMOL/L (ref 21–32)
CREAT SERPL-MCNC: 1.3 MG/DL (ref 0.6–1.2)
EKG ATRIAL RATE: 58 BPM
EKG DIAGNOSIS: NORMAL
EKG P AXIS: 28 DEGREES
EKG P-R INTERVAL: 164 MS
EKG Q-T INTERVAL: 466 MS
EKG QRS DURATION: 78 MS
EKG QTC CALCULATION (BAZETT): 457 MS
EKG R AXIS: -4 DEGREES
EKG T AXIS: 9 DEGREES
EKG VENTRICULAR RATE: 58 BPM
EOSINOPHILS ABSOLUTE: 0.3 K/UL (ref 0–0.6)
EOSINOPHILS RELATIVE PERCENT: 4.7 %
GFR AFRICAN AMERICAN: 50
GFR NON-AFRICAN AMERICAN: 41
GLUCOSE BLD-MCNC: 136 MG/DL (ref 70–99)
GLUCOSE BLD-MCNC: 165 MG/DL (ref 70–99)
GLUCOSE BLD-MCNC: 165 MG/DL (ref 70–99)
GLUCOSE BLD-MCNC: 304 MG/DL (ref 70–99)
HCT VFR BLD CALC: 28.2 % (ref 36–48)
HEMOGLOBIN: 9.4 G/DL (ref 12–16)
LYMPHOCYTES ABSOLUTE: 2 K/UL (ref 1–5.1)
LYMPHOCYTES RELATIVE PERCENT: 35.6 %
MAGNESIUM: 1.6 MG/DL (ref 1.8–2.4)
MCH RBC QN AUTO: 27.4 PG (ref 26–34)
MCHC RBC AUTO-ENTMCNC: 33.2 G/DL (ref 31–36)
MCV RBC AUTO: 82.4 FL (ref 80–100)
MONOCYTES ABSOLUTE: 0.4 K/UL (ref 0–1.3)
MONOCYTES RELATIVE PERCENT: 7.2 %
NEUTROPHILS ABSOLUTE: 2.9 K/UL (ref 1.7–7.7)
NEUTROPHILS RELATIVE PERCENT: 52 %
PDW BLD-RTO: 14 % (ref 12.4–15.4)
PERFORMED ON: ABNORMAL
PLATELET # BLD: 181 K/UL (ref 135–450)
PMV BLD AUTO: 9.1 FL (ref 5–10.5)
POTASSIUM REFLEX MAGNESIUM: 3.5 MMOL/L (ref 3.5–5.1)
RBC # BLD: 3.43 M/UL (ref 4–5.2)
SODIUM BLD-SCNC: 136 MMOL/L (ref 136–145)
TROPONIN: <0.01 NG/ML
WBC # BLD: 5.5 K/UL (ref 4–11)

## 2020-11-11 PROCEDURE — 93005 ELECTROCARDIOGRAM TRACING: CPT | Performed by: INTERNAL MEDICINE

## 2020-11-11 PROCEDURE — 6360000002 HC RX W HCPCS: Performed by: INTERNAL MEDICINE

## 2020-11-11 PROCEDURE — G0378 HOSPITAL OBSERVATION PER HR: HCPCS

## 2020-11-11 PROCEDURE — 80048 BASIC METABOLIC PNL TOTAL CA: CPT

## 2020-11-11 PROCEDURE — 6370000000 HC RX 637 (ALT 250 FOR IP): Performed by: INTERNAL MEDICINE

## 2020-11-11 PROCEDURE — 93010 ELECTROCARDIOGRAM REPORT: CPT | Performed by: INTERNAL MEDICINE

## 2020-11-11 PROCEDURE — 84484 ASSAY OF TROPONIN QUANT: CPT

## 2020-11-11 PROCEDURE — 6370000000 HC RX 637 (ALT 250 FOR IP): Performed by: NURSE PRACTITIONER

## 2020-11-11 PROCEDURE — 83735 ASSAY OF MAGNESIUM: CPT

## 2020-11-11 PROCEDURE — 36415 COLL VENOUS BLD VENIPUNCTURE: CPT

## 2020-11-11 PROCEDURE — 4A023N7 MEASUREMENT OF CARDIAC SAMPLING AND PRESSURE, LEFT HEART, PERCUTANEOUS APPROACH: ICD-10-PCS | Performed by: INTERNAL MEDICINE

## 2020-11-11 PROCEDURE — 85025 COMPLETE CBC W/AUTO DIFF WBC: CPT

## 2020-11-11 PROCEDURE — 027034Z DILATION OF CORONARY ARTERY, ONE ARTERY WITH DRUG-ELUTING INTRALUMINAL DEVICE, PERCUTANEOUS APPROACH: ICD-10-PCS | Performed by: INTERNAL MEDICINE

## 2020-11-11 PROCEDURE — 2580000003 HC RX 258: Performed by: INTERNAL MEDICINE

## 2020-11-11 PROCEDURE — B2151ZZ FLUOROSCOPY OF LEFT HEART USING LOW OSMOLAR CONTRAST: ICD-10-PCS | Performed by: INTERNAL MEDICINE

## 2020-11-11 PROCEDURE — 99238 HOSP IP/OBS DSCHRG MGMT 30/<: CPT | Performed by: INTERNAL MEDICINE

## 2020-11-11 PROCEDURE — B2111ZZ FLUOROSCOPY OF MULTIPLE CORONARY ARTERIES USING LOW OSMOLAR CONTRAST: ICD-10-PCS | Performed by: INTERNAL MEDICINE

## 2020-11-11 PROCEDURE — 94760 N-INVAS EAR/PLS OXIMETRY 1: CPT

## 2020-11-11 RX ORDER — LISINOPRIL 20 MG/1
20 TABLET ORAL DAILY
Qty: 30 TABLET | Refills: 3 | Status: SHIPPED
Start: 2020-11-12 | End: 2020-11-11 | Stop reason: HOSPADM

## 2020-11-11 RX ORDER — NICOTINE POLACRILEX 4 MG
15 LOZENGE BUCCAL PRN
Status: DISCONTINUED | OUTPATIENT
Start: 2020-11-11 | End: 2020-11-11 | Stop reason: HOSPADM

## 2020-11-11 RX ORDER — METOPROLOL SUCCINATE 50 MG/1
50 TABLET, EXTENDED RELEASE ORAL DAILY
Qty: 30 TABLET | Refills: 3 | Status: SHIPPED | OUTPATIENT
Start: 2020-11-12 | End: 2020-11-11

## 2020-11-11 RX ORDER — SPIRONOLACTONE 25 MG/1
25 TABLET ORAL DAILY
Status: DISCONTINUED | OUTPATIENT
Start: 2020-11-11 | End: 2020-11-11 | Stop reason: HOSPADM

## 2020-11-11 RX ORDER — METOPROLOL SUCCINATE 50 MG/1
50 TABLET, EXTENDED RELEASE ORAL DAILY
Status: DISCONTINUED | OUTPATIENT
Start: 2020-11-11 | End: 2020-11-11 | Stop reason: HOSPADM

## 2020-11-11 RX ORDER — METOPROLOL SUCCINATE 50 MG/1
50 TABLET, EXTENDED RELEASE ORAL DAILY
Qty: 30 TABLET | Refills: 3 | Status: SHIPPED | OUTPATIENT
Start: 2020-11-12 | End: 2020-11-13 | Stop reason: DRUGHIGH

## 2020-11-11 RX ORDER — LISINOPRIL 20 MG/1
20 TABLET ORAL DAILY
Status: DISCONTINUED | OUTPATIENT
Start: 2020-11-11 | End: 2020-11-11 | Stop reason: HOSPADM

## 2020-11-11 RX ORDER — SPIRONOLACTONE 25 MG/1
25 TABLET ORAL DAILY
Qty: 30 TABLET | Refills: 3 | Status: SHIPPED | OUTPATIENT
Start: 2020-11-12 | End: 2020-12-08 | Stop reason: SDUPTHER

## 2020-11-11 RX ORDER — DEXTROSE MONOHYDRATE 25 G/50ML
12.5 INJECTION, SOLUTION INTRAVENOUS PRN
Status: DISCONTINUED | OUTPATIENT
Start: 2020-11-11 | End: 2020-11-11 | Stop reason: HOSPADM

## 2020-11-11 RX ORDER — DEXTROSE MONOHYDRATE 50 MG/ML
100 INJECTION, SOLUTION INTRAVENOUS PRN
Status: DISCONTINUED | OUTPATIENT
Start: 2020-11-11 | End: 2020-11-11 | Stop reason: HOSPADM

## 2020-11-11 RX ORDER — ATORVASTATIN CALCIUM 80 MG/1
80 TABLET, FILM COATED ORAL NIGHTLY
Qty: 30 TABLET | Refills: 3 | Status: SHIPPED
Start: 2020-11-11 | End: 2020-11-24 | Stop reason: SDUPTHER

## 2020-11-11 RX ADMIN — ANTACID TABLETS 1000 MG: 500 TABLET, CHEWABLE ORAL at 09:11

## 2020-11-11 RX ADMIN — METOPROLOL SUCCINATE 50 MG: 50 TABLET, EXTENDED RELEASE ORAL at 09:11

## 2020-11-11 RX ADMIN — ENOXAPARIN SODIUM 40 MG: 40 INJECTION SUBCUTANEOUS at 09:11

## 2020-11-11 RX ADMIN — PANTOPRAZOLE SODIUM 40 MG: 40 TABLET, DELAYED RELEASE ORAL at 06:35

## 2020-11-11 RX ADMIN — CETIRIZINE HYDROCHLORIDE 10 MG: 10 TABLET, FILM COATED ORAL at 09:12

## 2020-11-11 RX ADMIN — SODIUM CHLORIDE, PRESERVATIVE FREE 10 ML: 5 INJECTION INTRAVENOUS at 09:13

## 2020-11-11 RX ADMIN — LISINOPRIL 20 MG: 20 TABLET ORAL at 09:11

## 2020-11-11 RX ADMIN — SODIUM BICARBONATE 650 MG: 650 TABLET ORAL at 09:12

## 2020-11-11 RX ADMIN — BETHANECHOL CHLORIDE 25 MG: 25 TABLET ORAL at 09:24

## 2020-11-11 RX ADMIN — SPIRONOLACTONE 25 MG: 25 TABLET ORAL at 09:12

## 2020-11-11 RX ADMIN — PILOCARPINE HYDROCHLORIDE 5 MG: 5 TABLET, FILM COATED ORAL at 09:24

## 2020-11-11 RX ADMIN — CLOPIDOGREL BISULFATE 75 MG: 75 TABLET ORAL at 09:12

## 2020-11-11 RX ADMIN — INSULIN GLARGINE 60 UNITS: 100 INJECTION, SOLUTION SUBCUTANEOUS at 09:35

## 2020-11-11 RX ADMIN — ASPIRIN 81 MG: 81 TABLET, CHEWABLE ORAL at 09:12

## 2020-11-11 RX ADMIN — POLYETHYLENE GLYCOL 3350 17 G: 17 POWDER, FOR SOLUTION ORAL at 09:34

## 2020-11-11 RX ADMIN — BUMETANIDE 1 MG: 1 TABLET ORAL at 09:24

## 2020-11-11 RX ADMIN — PILOCARPINE HYDROCHLORIDE 5 MG: 5 TABLET, FILM COATED ORAL at 14:34

## 2020-11-11 RX ADMIN — INSULIN LISPRO 8 UNITS: 100 INJECTION, SOLUTION INTRAVENOUS; SUBCUTANEOUS at 13:20

## 2020-11-11 ASSESSMENT — PAIN SCALES - GENERAL
PAINLEVEL_OUTOF10: 0

## 2020-11-11 NOTE — DISCHARGE SUMMARY
[address]  [id number] 72 y.o. 1955    Admit date: 11/9/2020    Discharge date:      Admitting Physician: Genaro Myers MD   Discharge Physician: Jolie Toth     Admission Diagnoses: Chest pain [R07.9]  CAD S/P percutaneous coronary angioplasty [I25.10, Z98.61]  Chest pain [R07.9]    Discharge Diagnoses:     Discharged Condition: good    Hospital Course: Loyd Gilmore was admitted with left arm aching pain associated with chest pain radiating to left side of the neck. The patient has history of CAD and stent to the LAD 6 years ago. She had a stress nuclear scan which showed ischemia involving the distal inferior wall. This led to diagnostic angiography yesterday which showed high-grade 80% lesion in the mid distal RCA that was stented. Invasive cardiac tests      1. There is single-vessel disease in this right dominant circulation. 2.  The left main is free of disease. 3.  The LAD in the proximal segment has a stent which is widely open. The second diagonal branch has an 80% ostial lesion but it appears to bequite stable with good flow. 4.  The LCX artery gives rise to two OM branches, the first one appears to have may be a 20-30% proximal lesion. The ramus has a 30% ostial lesion as well. 5.  The RCA is a dominant vessel. In the mid segment, there is a 50% lesion. In the mid distal segment, there is an 80% lesion. This is probably the culprit vessel. 6.   Successful PCI and stenting of the mid distal RCA lesion reduced from 80% to 0%.         Lab Results   Component Value Date    CREATININE 1.3 (H) 11/11/2020    BUN 18 11/11/2020     11/11/2020    K 3.5 11/11/2020     11/11/2020    CO2 25 11/11/2020      Lab Results   Component Value Date    WBC 5.5 11/11/2020    HGB 9.4 (L) 11/11/2020    HCT 28.2 (L) 11/11/2020    MCV 82.4 11/11/2020     11/11/2020      Lab Results   Component Value Date    INR 0.94 04/05/2014 PROTIME 10.5 04/05/2014    No results found for: BNP    Patient Instructions: Follow up with primary care provider 8 Weeks  Follow up with cardiology next appointment  Follow up with other consultant physicians at their directions. Discharge Medications:     [facility]   Home Medication Instructions IPZ:[id number]    Printed on:11/11/20 6394   Medication Information                      aspirin 81 MG chewable tablet  Take 1 tablet by mouth daily. atorvastatin (LIPITOR) 80 MG tablet  Take 1 tablet by mouth daily             atorvastatin (LIPITOR) 80 MG tablet  Take 1 tablet by mouth nightly             bethanechol (URECHOLINE) 25 MG tablet  Take 25 mg by mouth 2 times daily             bumetanide (BUMEX) 2 MG tablet  Take 1 mg by mouth daily              Calcium Carbonate Antacid (TUMS E-X 750 PO)  Take 1 tablet by mouth as needed              clopidogrel (PLAVIX) 75 MG tablet  Take 1 tablet by mouth daily             dexlansoprazole (DEXILANT) 60 MG CPDR capsule  Take 60 mg by mouth daily.              docusate sodium (STOOL SOFTENER) 100 MG capsule  Take 200 mg by mouth 2 times daily 2 tablets             famotidine (PEPCID) 20 MG tablet  Take 20 mg by mouth 2 times daily             fexofenadine (ALLEGRA ALLERGY) 180 MG tablet  Take 180 mg by mouth daily             FLUTICASONE PROPIONATE, NASAL, NA  2 sprays by Nasal route daily as needed (rhinitis)              insulin glargine (LANTUS) 100 UNIT/ML injection vial  Inject 70 Units into the skin daily              Liraglutide (VICTOZA) 18 MG/3ML SOPN SC injection  Inject 1.8 mg into the skin Daily with supper              lisinopril (PRINIVIL;ZESTRIL) 20 MG tablet  Take 20 mg by mouth 2 times daily              lisinopril (PRINIVIL;ZESTRIL) 20 MG tablet  Take 1 tablet by mouth daily             lubiprostone (AMITIZA) 24 MCG capsule  Take 24 mcg by mouth 2 times daily (with meals)             magnesium oxide (MAG-OX) 400 MG tablet  Take

## 2020-11-11 NOTE — PLAN OF CARE
Problem: Pain:  Goal: Pain level will decrease  Description: Pain level will decrease  Outcome: Ongoing     Problem: Skin Integrity:  Goal: Will show no infection signs and symptoms  Description: Will show no infection signs and symptoms  Outcome: Ongoing

## 2020-11-11 NOTE — PROGRESS NOTES
Pt arrived to room 5118 from Cath Lab. Pt is alert and oriented in bed. Son at bedside. VSS. Call light within reach of patient. Will continue to monitor.     Electronically signed by Steve Hauser RN on 11/10/2020 at 7:44 PM

## 2020-11-11 NOTE — PROGRESS NOTES
Pt discharged home at [place] [address]. Discharge instructions given and explained. All questions explained. IV is removed. Dressing applied to site. Transportation to home provided by son.     Electronically signed by Yumiko Rivas RN on 11/11/2020 at 6:39 PM

## 2020-11-11 NOTE — CARE COORDINATION
INITIAL CASE MANAGEMENT ASSESSMENT    Reviewed chart, met with patient to assess possible discharge needs. Explained Case Management role/services. Living Situation: confirmed address, lives alone [address]    ADLs: independent      DME: shower chair, cane (does not use currently)    PT/OT Recs: not ordered     Active Services: COA - meal delivery, life alert. Mental Health  through [facility]. Transportation: active , patient's car is here but if she is unable to  herself home at discharge her family will be able to transport home     Medications: Pharmacy: [facility] in [employer]ceipt[place], no issues obtaining or taking medications    PCP: previous PCP retired, patient now seeing Mike Martinez MD       HD/PD: none    PLAN/COMMENTS: Plan is home with family to assist. Discussed COA services, patient requesting information on more services such as assistance with grocery shopping. Sw call to [employer] [facility] ([phone]) and LM with patient's , Earle Daley. SW/CM provided contact information for patient or family to call with any questions. SW/CM will follow and assist as needed.     Electronically signed by LORENE Odom, ANTONI on 11/11/2020 at 11:47 AM

## 2020-11-11 NOTE — PROGRESS NOTES
CLINICAL PHARMACY NOTE: MEDS TO [facility] Patient?: No  Total # of Prescriptions Filled: 2   The following medications were delivered to the patient:  Current Discharge Medication List      START taking these medications    Details   spironolactone (ALDACTONE) 25 MG tablet Take 1 tablet by mouth daily  Qty: 30 tablet, Refills: 3      !! metoprolol succinate (TOPROL XL) 50 MG extended release tablet Take 1 tablet by mouth daily  Qty: 30 tablet, Refills: 3       !! - Potential duplicate medications found. Please discuss with provider.       ·   ·   Total # of Interventions Completed: 0  Time Spent (min): 30    Additional Documentation:      Madalyn Angelucci, PharmD, 11/11/2020 4:16 PM

## 2020-11-11 NOTE — PROGRESS NOTES
Angiogram planned. Coronary angiogram:  CONCLUSIONS:  1. Single-vessel coronary artery disease with 80% mid distal RCA and  50% mid LAD. 2.  Minor obstruction of the arteries and branches. 3.  Normal left ventricular size and systolic function. 4.  Elevated left heart filling pressures.       Interval History/Subjective: doing \"ok\" is pain free. PCI yesterday with stent placement with Dr. Kendra Saavedra to mid distal RCA lesion reduced from 80% to 0%     On statin 80mg, IYN90mo , plavix 75 mg already from home. Ok to go medically with any med recs from cardiology. BS elevated likely due to no insulin reordered after out of procedure. Will do ssi in the interim and restart home meds on d/c. Review of Systems:       The patient denied headaches, visual changes, LOC, SOB, CP, ABD pain, N/V/D, skin changes, new or worsening weakness or neuromuscular deficits. Comprehensive ROS negative except as mentioned above.     Past Medical History:        Diagnosis Date    Allergic     Anemia     Angina pectoris (HCC)     Anxiety     Arthritis     CAD (coronary artery disease)     Chronic kidney disease     Colitis     Depression     Frequent headaches     GERD (gastroesophageal reflux disease)     Gout     Heart disease     Heart murmur     Hyperlipidemia     Hypertension     IBS (irritable bowel syndrome)     Lung disease     Migraines     Obesity     Shortness of breath     Sjogren's disease [facility]) September 2015    Dr. Denise Wolff Thyroid disease     hypo when younger    Type 2 diabetes mellitus without complication ([facility] [address]     Type II or unspecified type diabetes mellitus without mention of complication, not stated as uncontrolled     Unspecified cerebral artery occlusion with cerebral infarction     TIA X 2       Past Surgical History:        Procedure Laterality Date    CARDIAC CATHETERIZATION      CORONARY ANGIOPLASTY WITH STENT PLACEMENT  3/27/2014    DILATION AND CURETTAGE OF UTERUS      X2 04/06/2017    MID 0.6 04/06/2017    GRAN 4.4 04/06/2017    LYMPHOPCT 35.6 11/11/2020    MIDPERCENT 8.0 04/06/2017    GRANULOCYTES 63.4 04/06/2017    RBC 3.43 (L) 11/11/2020    MCH 27.4 11/11/2020    MCHC 33.2 11/11/2020    RDW 14.0 11/11/2020       Lab Results   Component Value Date    CREATININE 1.3 (H) 11/11/2020    BUN 18 11/11/2020     11/11/2020    K 3.5 11/11/2020     11/11/2020    CO2 25 11/11/2020       Lab Results   Component Value Date    MG 1.60 11/11/2020       Lab Results   Component Value Date    ALT 33 11/09/2020    AST 34 11/09/2020    ALKPHOS 80 11/09/2020    BILITOT <0.2 11/09/2020        No flowsheet data found. Lab Results   Component Value Date    LABA1C 7.2 04/11/2016       Imaging:  NM Cardiac Stress Test Nuclear Imaging   Final Result      XR CHEST (2 VW)   Final Result   No acute process. Stable compared to prior study             Scheduled and prn Medications:    Scheduled Meds:   lisinopril  20 mg Oral Daily    metoprolol succinate  50 mg Oral Daily    spironolactone  25 mg Oral Daily    cetirizine  10 mg Oral Daily    pantoprazole  40 mg Oral Daily    calcium carbonate  1,000 mg Oral Daily    bumetanide  1 mg Oral Daily    bethanechol  25 mg Oral BID    atorvastatin  80 mg Oral Nightly    enoxaparin  40 mg Subcutaneous Daily    sodium bicarbonate  650 mg Oral BID    pilocarpine  5 mg Oral TID    lubiprostone  24 mcg Oral BID WC    sodium chloride flush  10 mL Intravenous 2 times per day    aspirin  81 mg Oral Daily    clopidogrel  75 mg Oral Daily    insulin glargine  60 Units Subcutaneous Daily     Continuous Infusions:   dextrose       PRN Meds:.polyethylene glycol, ondansetron, glucose, dextrose, glucagon (rDNA), dextrose, sodium chloride flush, acetaminophen, sodium chloride    Assessment & Plan:           Chest pain - Patient will be admitted and monitored on telemetry, and we will check serial cardiac enzymes.  Aspirin was started in the Emergency Room. Cardiac testing has been ordered for risk stratification. I have discussed other possible etiologies of the symptoms such as Musculoskeletal Pain and GERD, including unique presenting characteritics of each. Patient understands that if the evaluation does not show that the symptoms are secondary to ischemic changes, she will be discharged and instructed to follow up with her outpatient physician to help determine the etiology of her symptoms.      CAD (coronary artery disease) - As above. Continue Statin, Plavix and Aspirin. Monitor on Telemetry. Hold Beta Blocker until after stress test     Sjogren's disease (HCC) - continue Pilocarpine     Diabetes mellitus II - Lantus, SSI and carb control diet     Essential (primary) hypertension - continue home meds and monitor blood pressure     Hyperlipidemia - No current evidence of Rhabdomyolysis or other adverse effects. Continue statin therapy while in the hospital    Continue current regimen/therapies. Monitor. Adjust medical regimen as appropriate. Body mass index is 34.16 kg/m². The patient and / or the family were informed of the results of any tests, a time was given to answer questions, a plan was proposed and they agreed with plan.       DVT ppx: lovenox      Diet: DIET CARDIAC;    Consults:  IP CONSULT TO HOSPITALIST  IP CONSULT TO CARDIOLOGY  IP CONSULT TO CARDIAC REHAB    DISPO/placement plan: pending    Code Status: Full Code      ROSY Mc - MARTIN  11/11/20

## 2020-11-13 ENCOUNTER — TELEPHONE (OUTPATIENT)
Dept: CARDIOLOGY CLINIC | Age: 65
End: 2020-11-13

## 2020-11-13 RX ORDER — METOPROLOL SUCCINATE 25 MG/1
25 TABLET, EXTENDED RELEASE ORAL DAILY
COMMUNITY
End: 2020-11-24

## 2020-11-13 NOTE — TELEPHONE ENCOUNTER
Called patient. [facility] Patient verbalized and confirmed understanding.    Updated medication list.

## 2020-11-13 NOTE — TELEPHONE ENCOUNTER
Ok to decrease to 25 mg daily. I patient has BP cuff at home please instruct to monitor and record BP and HR daily and when she is experiencing symptoms of lightheadedness / dizziness.

## 2020-11-13 NOTE — TELEPHONE ENCOUNTER
Pt called stating Dr Romeo Sneed changed her metoprolol to 50mg 1 tablet daily and she is wondering if she can go back to 25mg take 1/2 tablet daily. She states she is nervous about getting dizzy and not being able to tolerate it since she had been on a higher dose a while back and it didn't work.     Please call the pt at [phone]

## 2020-11-24 ENCOUNTER — OFFICE VISIT (OUTPATIENT)
Dept: CARDIOLOGY CLINIC | Age: 65
End: 2020-11-24
Payer: MEDICARE

## 2020-11-24 VITALS
OXYGEN SATURATION: 98 % | HEART RATE: 84 BPM | HEIGHT: 68 IN | BODY MASS INDEX: 34.1 KG/M2 | SYSTOLIC BLOOD PRESSURE: 138 MMHG | WEIGHT: 225 LBS | TEMPERATURE: 98.2 F | DIASTOLIC BLOOD PRESSURE: 80 MMHG

## 2020-11-24 DIAGNOSIS — Z86.39 HISTORY OF HYPOKALEMIA: ICD-10-CM

## 2020-11-24 LAB — POTASSIUM SERPL-SCNC: 3.6 MMOL/L (ref 3.5–5.1)

## 2020-11-24 PROCEDURE — 1090F PRES/ABSN URINE INCON ASSESS: CPT | Performed by: INTERNAL MEDICINE

## 2020-11-24 PROCEDURE — G8484 FLU IMMUNIZE NO ADMIN: HCPCS | Performed by: INTERNAL MEDICINE

## 2020-11-24 PROCEDURE — G8427 DOCREV CUR MEDS BY ELIG CLIN: HCPCS | Performed by: INTERNAL MEDICINE

## 2020-11-24 PROCEDURE — 1123F ACP DISCUSS/DSCN MKR DOCD: CPT | Performed by: INTERNAL MEDICINE

## 2020-11-24 PROCEDURE — G8417 CALC BMI ABV UP PARAM F/U: HCPCS | Performed by: INTERNAL MEDICINE

## 2020-11-24 PROCEDURE — 4040F PNEUMOC VAC/ADMIN/RCVD: CPT | Performed by: INTERNAL MEDICINE

## 2020-11-24 PROCEDURE — 99214 OFFICE O/P EST MOD 30 MIN: CPT | Performed by: INTERNAL MEDICINE

## 2020-11-24 PROCEDURE — 3017F COLORECTAL CA SCREEN DOC REV: CPT | Performed by: INTERNAL MEDICINE

## 2020-11-24 PROCEDURE — 1036F TOBACCO NON-USER: CPT | Performed by: INTERNAL MEDICINE

## 2020-11-24 PROCEDURE — G8400 PT W/DXA NO RESULTS DOC: HCPCS | Performed by: INTERNAL MEDICINE

## 2020-11-24 PROCEDURE — 93000 ELECTROCARDIOGRAM COMPLETE: CPT | Performed by: INTERNAL MEDICINE

## 2020-11-24 PROCEDURE — 1111F DSCHRG MED/CURRENT MED MERGE: CPT | Performed by: INTERNAL MEDICINE

## 2020-11-24 RX ORDER — METOPROLOL SUCCINATE 25 MG/1
50 TABLET, EXTENDED RELEASE ORAL NIGHTLY
Qty: 180 TABLET | Refills: 3 | Status: SHIPPED | OUTPATIENT
Start: 2020-11-24 | End: 2021-03-23 | Stop reason: ALTCHOICE

## 2020-11-24 NOTE — PROGRESS NOTES
sodium (STOOL SOFTENER) 100 MG capsule Take 200 mg by mouth 2 times daily 2 tablets      Calcium Carbonate Antacid (TUMS E-X 750 PO) Take 1 tablet by mouth as needed        No current facility-administered medications for this visit. Review of Systems:  Review of systems is as detailed above and otherwise negative. Physical Exam:   /80   Pulse 84   Temp 98.2 °F (36.8 °C)   Ht 5' 8\" (1.727 m)   Wt 225 lb (102.1 kg)   SpO2 98%   BMI 34.21 kg/m²   Wt Readings from Last 3 Encounters:   11/11/20 224 lb 10.4 oz (101.9 kg)   08/24/20 219 lb (99.3 kg)   02/21/20 222 lb (100.7 kg)     Constitutional: She is oriented to person, place, and time. She appears well-developed and well-nourished. In no acute distress. Head: Normocephalic and atraumatic. Pupils equal and round. Neck: Neck supple. No JVP or carotid bruit appreciated. No mass and no thyromegaly present. No lymphadenopathy present. Cardiovascular: Normal rate. Normal heart sounds. Exam reveals no gallop and no friction rub. No murmur heard. Pulmonary/Chest: Effort normal and breath sounds normal. No respiratory distress. She has no wheezes, rhonchi or rales. Abdominal: Soft, non-tender. Bowel sounds are normal. She exhibits no organomegaly, mass or bruit. Extremities: No edema, cyanosis, or clubbing. Pulses are 2+ radial/dorsalis pedis/posterior tibial/carotid bilaterally. Neurological: No gross cranial nerve deficit. Coordination normal.   Skin: Skin is warm and dry. There is no rash or diaphoresis. Psychiatric: She has a normal mood and affect.  Her speech is normal and behavior is normal.     Lab Review:   [facility]   Lab Results   Component Value Date    TRIG 181 04/12/2016    HDL 34 06/10/2019    HDL 34 04/03/2012    LDLCALC 58 06/10/2019    LABVLDL 31 06/10/2019      Lab Results   Component Value Date     11/11/2020      Lab Results   Component Value Date    HGB 9.4 11/11/2020    HCT 28.2 11/11/2020     Lab Results   Component Value Date     11/11/2020     Lab Results   Component Value Date    K 3.5 11/11/2020     Lab Results   Component Value Date    BUN 18 11/11/2020    CREATININE 1.3 11/11/2020     EKG Interpretation:   5/2/18 Sinus Rhythm  Poor R wave progression  5/21/19 Sinus rhythm   2/21/2020 Sinus rhythm Poor R wave progression  8/24/20 Sinus  Rhythm with poor R wave progression  11/24/20 Sinus  Rhythm Old anterior infarct. ~82 bpm.     Image Review:     Stress 4/12/2016  Summary    There is normal isotope uptake at stress and rest. There is no evidence of    myocardial ischemia or scar.        With Lexiscan vasodilator stress the patient had no significant ST changes.    Nondiagnostic for ischemia. 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 Technically a satisfactory study.    2. Normal treadmill exercise stress portion of the study.    3. No evidence of Ischemia by Myocardial Perfusion Imaging.    4. Gated Study shows normal LV size and Systolic function; EF is 75%.    Pharmacological Stress/MPI Results:     Stress test:11/9/20  Summary     Pharmacological Stress/MPI Results:          1. Technically a satisfactory study.     2. Normal pharmacological stress portion of the study.     3. Small sized mild intensity reversible defect suggestive of small area of     ischemia involving the distal inferior wall.     4. Gated Study shows normal LV size and Systolic function; EF is 97%. [facility]:11/9/20  CONCLUSIONS:  1. Single-vessel coronary artery disease with 80% mid distal RCA and  50% mid LAD. 2.  Minor obstruction of the arteries and branches. 3.  Normal left ventricular size and systolic function.   4.  Elevated left heart filling pressures.     INTERVENTIONAL PROCEDURE:  Catheters used are JR4 guide with side holes,  Runthrough wire, a 2.5 x 15 mm KRISTI stent was used.     TECHNICAL COMMENTS:  The guide provided somewhat poor support for the  intervention procedure. However, the lesion was crossed without much  difficulty. I had some difficulty getting the stent across because of  the poor guide support but I was able to deliver it and dilated it up to  14 atmospheres which corresponds to a vessel size of approximately 2.7. Followup angiography shows 0% residual.     CONCLUSION:  Successful PCI and stenting of the mid distal RCA lesion  reduced from 80% to 0%. Assessment/Plan:    Coronary artery disease  Presents today in follow up from recent hospitalization presenting with chest pressure and left arm numbness. She underwent [facility] s/p successful PCI and stenting mid distal RCA on 11/10/20 per Dr. Yessenia Polo. Today, she denies any signs of symptoms of angina today but does complain of occasional chest discomfort. . I have instructed her to use SL Nitro. Patient is on BBlocker, ACEI, statin and dual antiplatelet therapy. Discussed Cardiac Rehab, phase II. Hyperlipidemia, unspecified  Last lipid profile was drawn on 6/29/20 [facility] was wnl except HDL 36, . She is on Lipitor 80 mg daily. Will repeat lipid/liver profile prior to next visit. Essential hypertension  Blood pressure is controlled today on current medical therapy. BMP 11/11/20 abnormal.   Her BP readings at home are elevated on her monitor. I have asked her to bring to the office to check against a manual BP check. Space out medical therapy by at least a few hours, instructions on AVS.     Heart fluttering sensation She reports hx of SVT. I have recommended increasing her Toprol-XL to 50 mg nightly. She reports daytime fatigue. She is also on verapamil. Diabetes  This patient has type 2 diabetes and established CV disease.  The American Diabetes Association guidelines state, Among patients with type 2 diabetes who have established atherosclerotic cardiovascular disease, SGLT2 inhibitors demonstrated cardiovascular disease benefit are recommended as part of the antihyperglycemic regimen. SGLT2 inhibitor was added to current antihyperglycemic regimen due to demonstrated cardiovascular benefit. We had started her on  Jardiance 10 mg daily but it was determined per her Nephrologist to discontinue due to RTA    History of hypokalemia  Last K 3.5 11/11/20, will repeat K now. She is on aldactone. Will add potassium if her potassium level remains below 4    Migraines  She is on Toprol-XL. Follow up in 6 months. Instructed to obtain flu vaccine this season and annually. Thank you very much for allowing me to participate in the care of your patient. Please do not hesitate to contact me if you have any questions. Sincerely,  Peggy Dean MD      [place] [address]  Ph: [phone]  Fax: [phone]    This note was scribed in the presence of Dr. Tez Mina MD by Boone Salvador RN. Physician Attestation:  The scribes documentation has been prepared under my direction and personally reviewed by me in its entirety. I confirm that the note above accurately reflects all work, treatment, procedures, and medical decision making performed by me.

## 2020-11-24 NOTE — PATIENT INSTRUCTIONS
diabetes, high blood pressure, and high cholesterol. How is coronary artery disease treated? · Your doctor will suggest that you make lifestyle changes. For example, your doctor may ask you to eat healthy foods, quit smoking, lose extra weight, and be more active. · You will have to take medicines. · Your doctor may suggest a procedure to open narrowed or blocked arteries. This is called angioplasty. Or your doctor may suggest using healthy blood vessels to create detours around narrowed or blocked arteries. This is called bypass surgery. Follow-up care is a key part of your treatment and safety. Be sure to make and go to all appointments, and call your doctor if you are having problems. It's also a good idea to know your test results and keep a list of the medicines you take. Where can you learn more? Go to https://Tiger Logistics.immatics biotechnologies. org and sign in to your Coco Controller account. Enter [phone] in the [facility] box to learn more about \"Learning About Coronary Artery Disease (CAD). \"     If you do not have an account, please click on the \"Sign Up Now\" link. Current as of: December 16, 2019               Content Version: 12.6  © 2323-3302 [employer]. Care instructions adapted under license by [facility] ([facility]). If you have questions about a medical condition or this instruction, always ask your healthcare professional. [address] any warranty or liability for your use of this information. Patient Education        Heart-Healthy Diet: Care Instructions  Your Care Instructions     A heart-healthy diet has lots of vegetables, fruits, nuts, beans, and whole grains, and is low in salt. It limits foods that are high in saturated fat, such as meats, cheeses, and fried foods. It may be hard to change your diet, but even small changes can lower your risk of heart attack and heart disease. Follow-up care is a key part of your treatment and safety.  Be sure to make and go to all appointments, and call your doctor if you are having problems. It's also a good idea to know your test results and keep a list of the medicines you take. How can you care for yourself at home? Watch your portions  · Learn what a serving is. A \"serving\" and a \"portion\" are not always the same thing. Make sure that you are not eating larger portions than are recommended. For example, a serving of pasta is ½ cup. A serving size of meat is 2 to 3 ounces. A 3-ounce serving is about the size of a deck of cards. Measure serving sizes until you are good at [place]" them. Keep in mind that restaurants often serve portions that are 2 or 3 times the size of one serving. · To keep your energy level up and keep you from feeling hungry, eat often but in smaller portions. · Eat only the number of calories you need to stay at a healthy weight. If you need to lose weight, eat fewer calories than your body burns (through exercise and other physical activity). Eat more fruits and vegetables  · Eat a variety of fruit and vegetables every day. Dark green, deep orange, red, or yellow fruits and vegetables are especially good for you. Examples include spinach, carrots, peaches, and berries. · Keep carrots, celery, and other veggies handy for snacks. Buy fruit that is in season and store it where you can see it so that you will be tempted to eat it. · Cook dishes that have a lot of veggies in them, such as stir-fries and soups. Limit saturated and trans fat  · Read food labels, and try to avoid saturated and trans fats. They increase your risk of heart disease. · Use olive or canola oil when you cook. · Bake, broil, grill, or steam foods instead of frying them. · Choose lean meats instead of high-fat meats such as hot dogs and sausages. Cut off all visible fat when you prepare meat. · Eat fish, skinless poultry, and meat alternatives such as soy products instead of high-fat meats.  Soy products, such as tofu, may be especially good for your heart. · Choose low-fat or fat-free milk and dairy products. Eat foods high in fiber  · Eat a variety of grain products every day. Include whole-grain foods that have lots of fiber and nutrients. Examples of whole-grain foods include oats, whole wheat bread, and brown rice. · Buy whole-grain breads and cereals, instead of white bread or pastries. Limit salt and sodium  · Limit how much salt and sodium you eat to help lower your blood pressure. · Taste food before you salt it. Add only a little salt when you think you need it. With time, your taste buds will adjust to less salt. · Eat fewer snack items, fast foods, and other high-salt, processed foods. Check food labels for the amount of sodium in packaged foods. · Choose low-sodium versions of canned goods (such as soups, vegetables, and beans). Limit sugar  · Limit drinks and foods with added sugar. These include candy, desserts, and soda pop. Limit alcohol  · Limit alcohol to no more than 2 drinks a day for men and 1 drink a day for women. Too much alcohol can cause health problems. When should you call for help? Watch closely for changes in your health, and be sure to contact your doctor if:    · You would like help planning heart-healthy meals. Where can you learn more? Go to https://Baby World Language.healthCoAlign. org and sign in to your BioCision account. Enter V137 in the [facility] box to learn more about \"Heart-Healthy Diet: Care Instructions. \"     If you do not have an account, please click on the \"Sign Up Now\" link. Current as of: August 22, 2019               Content Version: 12.6  © 8446-0502 [employer]. Care instructions adapted under license by [facility] ([facility]). If you have questions about a medical condition or this instruction, always ask your healthcare professional. [address]  any warranty or liability for your use of this information.          Patient Education        How to Read a Food Label to Limit Sodium: Care Instructions  Your Care Instructions  Sodium causes your body to hold on to extra water. This can raise your blood pressure and force your heart and kidneys to work harder. In very serious cases, this could cause you to be put in the hospital. It might even be life-threatening. By limiting sodium, you will feel better and lower your risk of serious problems. Processed foods, fast food, and restaurant foods are the major sources of dietary sodium. The most common name for sodium is salt. Try to limit how much sodium you eat to less than 2,300 milligrams (mg) a day. If you limit your sodium to 1,500 mg a day, you can lower your blood pressure even more. This limit counts all the salt that you eat in foods you cook or in packaged foods. Keep a list of everything you eat and drink. Follow-up care is a key part of your treatment and safety. Be sure to make and go to all appointments, and call your doctor if you are having problems. It's also a good idea to know your test results and keep a list of the medicines you take. How can you care for yourself at home? Read ingredient lists on food labels  · Read the list of ingredients on food labels to help you find how much sodium is in a food. The label lists the ingredients in a food in descending order (from the most to the least). If salt or sodium is high on the list, there may be a lot of sodium in the food. · Know that sodium has different names. Sodium is also called monosodium glutamate (MSG, common in [facility] food), sodium citrate, sodium alginate, sodium hydroxide, and sodium phosphate. Read Nutrition Facts labels  · On most foods, there is a Nutrition Facts label. This will tell you how much sodium is in one serving of food. Look at both the serving size and the sodium amount. The serving size is located at the top of the label, usually right under the \"Nutrition Facts\" title.  The amount of sodium is given in the list under the title. It is given in milligrams (mg). ? Check the serving size carefully. A single serving is often very small, and you may eat more than one serving. If this is the case, you will eat more sodium than listed on the label. For example, if the serving size for a canned soup is 1 cup and the sodium amount is 470 mg, if you have 2 cups you will eat 940 mg of sodium. · The nutrition facts for fresh fruits and vegetables are not listed on the food. They may be listed somewhere in the store. These foods usually have no sodium or low sodium. · The Nutrition Facts label also gives you the Percent Daily Value for sodium. This is how much of the recommended amount of sodium a serving contains. The daily value for sodium is less than 2,300 mg. So if the Percent Daily Value says 50%, this means one serving is giving you half of this, or 1,150 mg. Buy low-sodium foods  · Look for foods that are made with less sodium. Watch for the following words on the label. ? \"Unsalted\" means there is no sodium added to the food. But there may be sodium already in the food naturally. ? \"Sodium-free\" means a serving has less than 5 mg of sodium. ? \"Very low sodium\" means a serving has 35 mg or less of sodium. ? \"Low-sodium\" means a serving has 140 mg or less of sodium. · \"Reduced-sodium\" means that there is 25% less sodium than what the food normally has. This is still usually too much sodium. Try not to buy foods with this on the label. · Buy fresh vegetables, or frozen vegetables without added sauces. Buy low-sodium versions of canned vegetables, soups, and other canned goods. Where can you learn more? Go to https://Tutti DynamicslouisroundCorner.AppliLog. org and sign in to your powervault account. Enter [phone] [id number] in the [facility] box to learn more about \"How to Read a Food Label to Limit Sodium: Care Instructions. \"     If you do not have an account, please click on the \"Sign Up Now\" link.   Current as of: August 22, 2019               Content Version: 12.6  © 6860-2817 [employer]. Care instructions adapted under license by [employer]. If you have questions about a medical condition or this instruction, always ask your healthcare professional. [address] any warranty or liability for your use of this information. Patient Education        Walking for Exercise: Care Instructions  Your Care Instructions     Walking is one of the easiest ways to get the exercise you need for good health. A brisk, 30-minute walk each day can help you feel better and have more energy. It can help you lower your risk of disease. Walking can help you keep your bones strong and your heart healthy. Check with your doctor before you start a walking plan if you have heart problems, other health issues, or you have not been active in a long time. Follow your doctor's instructions for safe levels of exercise. Follow-up care is a key part of your treatment and safety. Be sure to make and go to all appointments, and call your doctor if you are having problems. It's also a good idea to know your test results and keep a list of the medicines you take. How can you care for yourself at home? Getting started  · Start slowly and set a short-term goal. For example, walk for 5 or 10 minutes every day. · Bit by bit, increase the amount you walk every day. Try for at least 30 minutes on most days of the week. You also may want to swim, bike, or do other activities. · If finding enough time is a problem, it is fine to be active in blocks of 10 minutes or more throughout your day and week. · To get the heart-healthy benefits of walking, you need to walk briskly enough to increase your heart rate and breathing, but not so fast that you cannot talk comfortably. · Wear comfortable shoes that fit well and provide good support for your feet and ankles.   Staying with your plan  · After you've made walking a Exercise: Care Instructions. \"     If you do not have an account, please click on the \"Sign Up Now\" link. Current as of: January 16, 2020               Content Version: 12.6  © 5649-2165 Met[employer]. Care instructions adapted under license by [facility] ([facility]). If you have questions about a medical condition or this instruction, always ask your healthcare professional. [address] any warranty or liability for your use of this information.

## 2020-11-25 ENCOUNTER — TELEPHONE (OUTPATIENT)
Dept: CARDIOLOGY CLINIC | Age: 65
End: 2020-11-25

## 2020-11-25 NOTE — TELEPHONE ENCOUNTER
Called and discussed with Dr. Titi Castañeda. Okay to start KCL 20 meq daily and recheck K in 2 weeks. We just saw Jarred Johnson yesterday and she is also on Aldactone. She is > 2 weeks from her procedure and should have resume all normal activity along with > 10 pounds weight limit as that was only for the 1st 5-7 days post procedure. Any questions, let me know. Thanks.

## 2020-11-25 NOTE — TELEPHONE ENCOUNTER
TEST/LAB RESULTS      WHICH RESULTS ARE YOU CALLING ABOUT:    Potassium     WHEN WAS THIS DONE:    11/24    WHERE DID YOU HAVE THIS DONE:  [facility]    CALL BACK NUMBER:      Jamia Rahman an office visit with Dr. Bello Martinez yesterday and forgot to ask if she was allowed to lift more than 10 lbs.        Jamia Rahman can be reached at [phone]

## 2020-12-04 ENCOUNTER — TELEPHONE (OUTPATIENT)
Dept: CARDIOLOGY CLINIC | Age: 65
End: 2020-12-04

## 2020-12-04 NOTE — TELEPHONE ENCOUNTER
Pt called stating that Dr Luis Rahman wanted her to get another potassium lab done to check how she was doing.      She asks that be fax the order to [facility] at  [phone]    Please call the pt when this has been faxed [phone]

## 2020-12-04 NOTE — TELEPHONE ENCOUNTER
Updated Medication list since potassium was not added to medication list.  Will send rx to [facility] lab as requested by patient since she lives an hour away.   Lab Fax [phone]

## 2020-12-04 NOTE — TELEPHONE ENCOUNTER
Note from [place] today      Pt called stating that Dr Marylou Arndt wanted her to get another potassium lab done to check how she was doing.      She asks that be fax the order to  [facility] at  [phone]     Please call the pt when this has been faxed [phone]

## 2020-12-08 RX ORDER — SPIRONOLACTONE 25 MG/1
25 TABLET ORAL DAILY
Qty: 30 TABLET | Refills: 3 | Status: SHIPPED | OUTPATIENT
Start: 2020-12-08 | End: 2021-03-23

## 2020-12-11 ENCOUNTER — TELEPHONE (OUTPATIENT)
Dept: CARDIOLOGY CLINIC | Age: 65
End: 2020-12-11

## 2020-12-11 NOTE — TELEPHONE ENCOUNTER
Attempted to call patient. Left message that I was calling her back. Requested call back. Also requested the phone number for the lab she went to so we can call for the results. Last OV 11/24/20. Essential hypertension  Blood pressure is controlled today on current medical therapy. BMP 11/11/20 abnormal.   Her BP readings at home are elevated on her monitor. I have asked her to bring to the office to check against a manual BP check.  Space out medical therapy by at least a few hours, instructions on AVS.

## 2020-12-11 NOTE — TELEPHONE ENCOUNTER
Leo Medeiros called in this afternoon stating she had her potassium level checked at [facility] on 12/7/20. They were supposed to fax the results over and she has not received them yet.        Leo Medeiros states she's also having high BP-    12/4- 153/68  12/5-158/69  12/6- 142/74  12/7- 138/69  12/8- 141/76  12/9- 144/72  12/10- 142/69  12/11- 145/64    Leo Medeiros can be reached at [phone]

## 2020-12-11 NOTE — TELEPHONE ENCOUNTER
Will await fax for lab results. Per Dr. Noah Kong office note, she was to bring her home cuff to the office to check for accuracy. If she has not done this, please schedule her for an MA visit with BP check. Her BP readings are fairly well controlled and she should continue to space out her medications.

## 2020-12-11 NOTE — TELEPHONE ENCOUNTER
Found [facility] [phone]. Requested results to be faxed to our office.     Potassium level 4.4

## 2020-12-11 NOTE — TELEPHONE ENCOUNTER
FY:  Patient returned call to our office. Relayed message and instructions. Patient verbalized and confirmed appointment. Patient said that she already checked BP cuff with the physician's office at [facility] and it was only two or four points different. She lives an hour away so she brought her home BP cuff there to compare.

## 2021-03-12 ENCOUNTER — TELEPHONE (OUTPATIENT)
Dept: CARDIOLOGY CLINIC | Age: 66
End: 2021-03-12

## 2021-03-12 NOTE — TELEPHONE ENCOUNTER
Pt called 3/11 to get blood work orders faxed to [facility] ([facility] fax # [phone] # [phone] to facility on 3/12. SUCCESS. Called and left voice mail for Abundio Chaney that this was done already.

## 2021-03-22 NOTE — PROGRESS NOTES
[address]      Cardiology Progress Note    Brian Zarco  1955 March 23, 2021     CC: \"I have no heart issues. \"     HPI:  The patient is 77 y.o. female with a past medical history significant for CAD,S/p PCI and HTN who is here for a follow up. She was admitted to [facility] in 8/15/2017 for severe dehydration secondary to intractable nausea vomiting and diarrhea for 10 days prior. She developed acute renal failure and acute pancreatitis. She was seen by nephrology and continues to follow with them. Today, she denies any new cardiac sounding complaints. She reports that she had some issues with her BP being elevated, went to a Nephrologist, stage IV in December, she has had some medical therapy changes with both improvement in blood pressure and kidney function. Possibly due to sjogren's disease. Patient denies exertional chest pain/pressure, dyspnea at rest, ZAVALETA, PND, orthopnea, palpitations, lightheadedness, weight changes, changes in LE edema, and syncope. She reports medical therapy compliance and feels like she is tolerating the changes. She has obtain 1st dose of COVID vaccine.        Past Medical History:   Diagnosis Date    Allergic     Anemia     Angina pectoris (HCC)     Anxiety     Arthritis     CAD (coronary artery disease)     Chronic kidney disease     Colitis     Depression     Frequent headaches     GERD (gastroesophageal reflux disease)     Gout     Heart disease     Heart murmur     Hyperlipidemia     Hypertension     IBS (irritable bowel syndrome)     Lung disease     Migraines     Obesity     Shortness of breath     Sjogren's disease [facility]) September 2015    Dr. Bridgett Guy Thyroid disease     hypo when younger    Type 2 diabetes mellitus without complication ([address]     Type II or unspecified type diabetes mellitus without mention of complication, not stated as uncontrolled     Unspecified cerebral artery occlusion with cerebral infarction TIA X 2     Past Surgical History:   Procedure Laterality Date    CARDIAC CATHETERIZATION      CORONARY ANGIOPLASTY WITH STENT PLACEMENT  3/27/2014    DILATION AND CURETTAGE OF UTERUS      X2    HYSTERECTOMY      OVARY REMOVAL  1991    PTCA      stent    SINUS SURGERY      URETHRAL STRICTURE DILATATION       Family History   Problem Relation Age of Onset    Diabetes Mother     Heart Disease Mother     Osteoarthritis Mother     Cancer Mother         colon    Heart Disease Father     Coronary Art Dis Father     Hypertension Father     Seizures Son     Osteoarthritis Sister     Seizures Other         family history    Diabetes Other         family history    Osteoarthritis Other         family history    Coronary Art Dis Other         family history    Alzheimer's Disease Other         family history    Hypertension Brother     Elevated Lipids Brother     Osteoarthritis Brother     Allergies Brother      Social History     Tobacco Use    Smoking status: Never Smoker    Smokeless tobacco: Never Used   Substance Use Topics    Alcohol use: No    Drug use: No     Comment: not reported       Allergies   Allergen Reactions    Bee Venom     Hydroxychloroquine Other (See Comments)     pancreatitis    Macrodantin [Nitrofurantoin]      High fevers    Quercus Robur     Singulair [Montelukast Sodium]      Heart racing    Sulfa Antibiotics      Doesn't remember    Wasp Venom     Ciprofloxacin Nausea And Vomiting    Flagyl [Metronidazole] Nausea And Vomiting     Current Outpatient Medications   Medication Sig Dispense Refill    carvedilol (COREG) 12.5 MG tablet Take 12.5 mg by mouth 2 times daily (with meals)      POTASSIUM PO Take 20 mEq by mouth 2 times daily       metFORMIN (GLUCOPHAGE-XR) 500 MG extended release tablet Take 500 mg by mouth 2 times daily      magnesium oxide (MAG-OX) 400 MG tablet Take 400 mg by mouth 3 times daily       clopidogrel (PLAVIX) 75 MG tablet Take 1 energy level, sleep pattern, or activity level. No fevers, chills. · Eyes: No visual changes or diplopia. No scleral icterus. · ENT: No Headaches, hearing loss or vertigo. No mouth sores or sore throat. · Cardiovascular: as reviewed in HPI  · Respiratory: No cough or wheezing, no sputum production. No hematemesis. · Gastrointestinal: No abdominal pain, appetite loss, blood in stools. No change in bowel or bladder habits. · Genitourinary: No dysuria, trouble voiding, or hematuria. · Musculoskeletal:  No gait disturbance, no joint complaints. · Integumentary: No rash or pruritis. · Neurological: No headache, diplopia, change in muscle strength, numbness or tingling. · Psychiatric: No anxiety or depression. · Endocrine: No temperature intolerance. No excessive thirst, fluid intake, or urination. No tremor. · Hematologic/Lymphatic: No abnormal bruising or bleeding, blood clots or swollen lymph nodes. · Allergic/Immunologic: No nasal congestion or hives. Physical Exam:   /84   Pulse 80   Temp 99.6 °F (37.6 °C)   Ht 5' 8\" (1.727 m)   Wt 231 lb (104.8 kg) Comment: with shoes  SpO2 98%   BMI 35.12 kg/m²   Wt Readings from Last 3 Encounters:   03/23/21 231 lb (104.8 kg)   11/24/20 225 lb (102.1 kg)   11/11/20 224 lb 10.4 oz (101.9 kg)     Constitutional: She is oriented to person, place, and time. She appears well-developed and well-nourished. In no acute distress. Head: Normocephalic and atraumatic. Pupils equal and round. Neck: Neck supple. No JVP or carotid bruit appreciated. No mass and no thyromegaly present. No lymphadenopathy present. Cardiovascular: Normal rate. Normal heart sounds. Exam reveals no gallop and no friction rub. No murmur heard. Pulmonary/Chest: Effort normal and breath sounds normal. No respiratory distress. She has no wheezes, rhonchi or rales. Abdominal: Soft, non-tender. Bowel sounds are normal. She exhibits no organomegaly, mass or bruit.    Extremities: No edema, cyanosis, or clubbing. Pulses are 2+ radial/dorsalis pedis/posterior tibial/carotid bilaterally. Neurological: No gross cranial nerve deficit. Coordination normal.   Skin: Skin is warm and dry. There is no rash or diaphoresis. Psychiatric: She has a normal mood and affect. Her speech is normal and behavior is normal.     Lab Review:   [facility] 3/16/21  Lab Results   Component Value Date    TRIG 158 03/16/2021    HDL 35 03/16/2021    HDL 34 04/03/2012    LDLCALC 43 03/16/2021    LABVLDL 32 03/16/2021      Lab Results   Component Value Date     11/11/2020      Lab Results   Component Value Date    HGB 9.4 11/11/2020    HCT 28.2 11/11/2020     Lab Results   Component Value Date     03/16/2021     Lab Results   Component Value Date    K 4.2 03/16/2021    K 3.5 11/11/2020     Lab Results   Component Value Date    BUN 24 03/16/2021    CREATININE 1.3 11/11/2020     EKG Interpretation:   5/2/18 Sinus Rhythm  Poor R wave progression  5/21/19 Sinus rhythm   2/21/2020 Sinus rhythm Poor R wave progression  8/24/20 Sinus  Rhythm with poor R wave progression  11/24/20 Sinus  Rhythm Old anterior infarct. ~82 bpm.   3/23/21: not completed     Image Review:     Stress 4/12/2016  Summary    There is normal isotope uptake at stress and rest. There is no evidence of    myocardial ischemia or scar.        With Lexiscan vasodilator stress the patient had no significant ST changes.    Nondiagnostic for ischemia. 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  Technically a satisfactory study.    2. Normal treadmill exercise stress portion of the study.    3. No evidence of Ischemia by Myocardial Perfusion Imaging.    4. Gated Study shows normal LV size and Systolic function; EF is 36%.    Pharmacological Stress/MPI Results:     Stress test:11/9/20  Summary  Pharmacological Stress/MPI Results:          1. Technically a satisfactory study.     2. Normal pharmacological stress portion of the study.     3. Small sized mild intensity reversible defect suggestive of small area of     ischemia involving the distal inferior wall.     4. Gated Study shows normal LV size and Systolic function; EF is 64%. [facility]:11/9/20  CONCLUSIONS:  1. Single-vessel coronary artery disease with 80% mid distal RCA and  50% mid LAD. 2.  Minor obstruction of the arteries and branches. 3.  Normal left ventricular size and systolic function. 4.  Elevated left heart filling pressures.     INTERVENTIONAL PROCEDURE:  Catheters used are JR4 guide with side holes,  Runthrough wire, a 2.5 x 15 mm KRISTI stent was used.     TECHNICAL COMMENTS:  The guide provided somewhat poor support for the  intervention procedure. However, the lesion was crossed without much  difficulty. I had some difficulty getting the stent across because of  the poor guide support but I was able to deliver it and dilated it up to  14 atmospheres which corresponds to a vessel size of approximately 2.7. Followup angiography shows 0% residual.     CONCLUSION:  Successful PCI and stenting of the mid distal RCA lesion  reduced from 80% to 0%. Assessment/Plan:    Coronary artery disease  She underwent [facility] s/p successful PCI and stenting mid distal RCA on 11/10/20 per Dr. Jay Moore. Today, she denies any signs of symptoms of angina and has remained stable. she is on BBlocker, ACEI, statin and dual antiplatelet therapy. Hyperlipidemia, unspecified  Stable with no reported myalgias. Last lipid profile was drawn on stable except low HDL 35 and high . She is on Lipitor 80 mg daily. Essential hypertension  Blood pressure is better  controlled  on current medical therapy with Coreg and Lisinopril adjusted by nephrology. [facility] 3/16/21 stable. Her BP readings at home have been stable with reported 's on her monitor. She is now following with Nephrology. I have asked her to work on a low salt diet and continue to monitor. Heart fluttering She reports hx of SVT. She denies any further heart palpitations, fluttering, racing and has remained stable on beta blocker therapy. Diabetes  This patient has type 2 diabetes and established CV disease. The American Diabetes Association guidelines state, Among patients with type 2 diabetes who have established atherosclerotic cardiovascular disease, SGLT2 inhibitors demonstrated cardiovascular disease benefit are recommended as part of the antihyperglycemic regimen. SGLT2 inhibitor was added to current antihyperglycemic regimen due to demonstrated cardiovascular benefit. We had started her on Jardiance 10 mg daily but it was determined per her Nephrologist to discontinue due to RTA    History of hypokalemia  She is off of spironolactone but has remained on KCL supplement per Nephrologist. Last K 4.2 3/16/21. Migraines  She is on Toprol-XL. Follow up in 6/7 months. Thank you very much for allowing me to participate in the care of your patient. Please do not hesitate to contact me if you have any questions. Sincerely,  Niru Cooper MD    This note was scribed in the presence of Dr. Jane Michelle MD by Rodger Felix RN.   Complexity of medical decision [facility], [facility] [address]  Ph: [phone]  Fax: [phone]

## 2021-03-23 ENCOUNTER — OFFICE VISIT (OUTPATIENT)
Dept: CARDIOLOGY CLINIC | Age: 66
End: 2021-03-23
Payer: MEDICARE

## 2021-03-23 VITALS
HEIGHT: 68 IN | OXYGEN SATURATION: 98 % | WEIGHT: 231 LBS | DIASTOLIC BLOOD PRESSURE: 84 MMHG | TEMPERATURE: 99.6 F | BODY MASS INDEX: 35.01 KG/M2 | HEART RATE: 80 BPM | SYSTOLIC BLOOD PRESSURE: 134 MMHG

## 2021-03-23 DIAGNOSIS — I10 ESSENTIAL HYPERTENSION: ICD-10-CM

## 2021-03-23 DIAGNOSIS — E78.5 HYPERLIPIDEMIA, UNSPECIFIED HYPERLIPIDEMIA TYPE: ICD-10-CM

## 2021-03-23 DIAGNOSIS — I25.10 CORONARY ARTERY DISEASE INVOLVING NATIVE CORONARY ARTERY OF NATIVE HEART WITHOUT ANGINA PECTORIS: Primary | ICD-10-CM

## 2021-03-23 PROCEDURE — 1123F ACP DISCUSS/DSCN MKR DOCD: CPT | Performed by: INTERNAL MEDICINE

## 2021-03-23 PROCEDURE — 4040F PNEUMOC VAC/ADMIN/RCVD: CPT | Performed by: INTERNAL MEDICINE

## 2021-03-23 PROCEDURE — G8427 DOCREV CUR MEDS BY ELIG CLIN: HCPCS | Performed by: INTERNAL MEDICINE

## 2021-03-23 PROCEDURE — 99214 OFFICE O/P EST MOD 30 MIN: CPT | Performed by: INTERNAL MEDICINE

## 2021-03-23 PROCEDURE — 3017F COLORECTAL CA SCREEN DOC REV: CPT | Performed by: INTERNAL MEDICINE

## 2021-03-23 PROCEDURE — G8400 PT W/DXA NO RESULTS DOC: HCPCS | Performed by: INTERNAL MEDICINE

## 2021-03-23 PROCEDURE — G8484 FLU IMMUNIZE NO ADMIN: HCPCS | Performed by: INTERNAL MEDICINE

## 2021-03-23 PROCEDURE — 1036F TOBACCO NON-USER: CPT | Performed by: INTERNAL MEDICINE

## 2021-03-23 PROCEDURE — G8417 CALC BMI ABV UP PARAM F/U: HCPCS | Performed by: INTERNAL MEDICINE

## 2021-03-23 PROCEDURE — 1090F PRES/ABSN URINE INCON ASSESS: CPT | Performed by: INTERNAL MEDICINE

## 2021-03-23 RX ORDER — CARVEDILOL 12.5 MG/1
12.5 TABLET ORAL 2 TIMES DAILY WITH MEALS
COMMUNITY

## 2021-03-23 NOTE — PATIENT INSTRUCTIONS
Patient Education        Learning About Coronary Artery Disease (CAD)  What is coronary artery disease? Coronary artery disease is a condition that occurs when plaque builds up in the arteries that bring oxygen-rich blood to your heart. Plaque is a fatty substance made of cholesterol, calcium, and other substances in the blood. This process is called hardening of the arteries, or atherosclerosis. What happens when you have coronary artery disease? · Plaque may narrow the coronary arteries. Narrowed arteries cause poor blood flow. This can lead to angina symptoms such as chest pain or discomfort. If blood flow is completely blocked, you could have a heart attack. · You can slow and reduce the risk of future problems by making changes in your lifestyle. These include quitting smoking and eating heart-healthy foods. · Treatment, along with changes in your lifestyle, can help you live a longer and healthier life. How can you prevent coronary artery disease? · Do not smoke. It may be the best thing you can do to prevent coronary artery disease. If you need help quitting, talk to your doctor about stop-smoking programs and medicines. These can increase your chances of quitting for good. · Be active. Try to do moderate activity at least 2½ hours a week. Or try to do vigorous activity at least 1¼ hours a week. You may want to walk or try other activities, such as running, swimming, cycling, or playing tennis or team sports. · Eat heart-healthy foods. Eat more fruits and vegetables and less food that contains saturated and trans fats. Limit alcohol, sodium, and sweets. · Stay at a healthy weight. Lose weight if you need to. · Manage other health problems such as diabetes, high blood pressure, and high cholesterol. How is coronary artery disease treated? · Your doctor will suggest that you make lifestyle changes.  For example, your doctor may ask you to eat healthy foods, quit smoking, lose extra weight, and be more active. · You will take medicines that help prevent a heart attack. · Your doctor may suggest a procedure to open narrowed or blocked arteries. This is called angioplasty. Or your doctor may suggest using healthy blood vessels to create detours around narrowed or blocked arteries. This is called bypass surgery. Follow-up care is a key part of your treatment and safety. Be sure to make and go to all appointments, and call your doctor if you are having problems. It's also a good idea to know your test results and keep a list of the medicines you take. Where can you learn more? Go to https://IceMos Technologypepiceweb.Phoenix Energy Technologies. org and sign in to your Radcom account. Enter [id number] in the Q Chip box to learn more about \"Learning About Coronary Artery Disease (CAD). \"     If you do not have an account, please click on the \"Sign Up Now\" link. Current as of: August 31, 2020               Content Version: 12.8  © 2006-2021 [employer]. Care instructions adapted under license by [facility] [place] ([facility]). If you have questions about a medical condition or this instruction, always ask your healthcare professional. [place] [address] any warranty or liability for your use of this information. Patient Education        Learning About Low-Sodium Foods  What foods are low in sodium? The foods you eat contain nutrients, such as vitamins and minerals. Sodium is a nutrient. Your body needs the right amount to stay healthy and work as it should. You can use the list below to help you make choices about which foods to eat.   Fruits  · Fresh, frozen, canned, or dried fruit  Vegetables  · Fresh or frozen vegetables, with no added salt  · Canned vegetables, low-sodium or with no added salt  Grains  · Bagels without salted tops  · Cereal with no added salt  · Corn tortillas  · Crackers with no added salt  · Oatmeal, cooked without salt  · Popcorn with no salt  · Pasta and noodles, cooked without salt  · Rice, cooked without salt  · Unsalted pretzels  Dairy and dairy alternatives  · Butter, unsalted  · Cream cheese  · Ice cream  · Milk  · Soy milk  Meats and other protein foods  · Beans and peas, canned with no salt  · Eggs  · Fresh fish (not smoked)  · Fresh meats (not smoked or cured)  · Nuts and nut butter, prepared without salt  · Poultry, not packaged with sodium solution  · Tofu, unseasoned  · Tuna, canned without salt  Seasonings  · Garlic  · Herbs and spices  · Lemon juice  · Mustard  · Olive oil  · Salt-free seasoning mixes  · Vinegar  Work with your doctor to find out how much of this nutrient you need. Depending on your health, you may need more or less of it in your diet. Where can you learn more? Go to https://chpehugoeweb.Kodkod. org and sign in to your Tamar [employer] account. Enter I879 in the SWYF box to learn more about \"Learning About Low-Sodium Foods. \"     If you do not have an account, please click on the \"Sign Up Now\" link. Current as of: December 17, 2020               Content Version: 12.8  © 2742-0707 [employer]. Care instructions adapted under license by [facility] ([facility]). If you have questions about a medical condition or this instruction, always ask your healthcare professional. [address] any warranty or liability for your use of this information. Patient Education        Heart-Healthy Diet: Care Instructions  Your Care Instructions     A heart-healthy diet has lots of vegetables, fruits, nuts, beans, and whole grains, and is low in salt. It limits foods that are high in saturated fat, such as meats, cheeses, and fried foods. It may be hard to change your diet, but even small changes can lower your risk of heart attack and heart disease. Follow-up care is a key part of your treatment and safety. Be sure to make and go to all appointments, and call your doctor if you are having problems.  It's also a good idea to know your test results and keep a list of the medicines you take. How can you care for yourself at home? Watch your portions  · Use food labels to learn what the recommended servings are for the foods you eat. · Eat only the number of calories you need to stay at a healthy weight. If you need to lose weight, eat fewer calories than your body burns (through exercise and other physical activity). Eat more fruits and vegetables  · Eat a variety of fruit and vegetables every day. Dark green, deep orange, red, or yellow fruits and vegetables are especially good for you. Examples include spinach, carrots, peaches, and berries. · Keep carrots, celery, and other veggies handy for snacks. Buy fruit that is in season and store it where you can see it so that you will be tempted to eat it. · Cook dishes that have a lot of veggies in them, such as stir-fries and soups. Limit saturated fat  · Read food labels, and try to avoid saturated fats. They increase your risk of heart disease. · Use olive or canola oil when you cook. · Bake, broil, grill, or steam foods instead of frying them. · Choose lean meats instead of high-fat meats such as hot dogs and sausages. Cut off all visible fat when you prepare meat. · Eat fish, skinless poultry, and meat alternatives such as soy products instead of high-fat meats. Soy products, such as tofu, may be especially good for your heart. · Choose low-fat or fat-free milk and dairy products. Eat foods high in fiber  · Eat a variety of grain products every day. Include whole-grain foods that have lots of fiber and nutrients. Examples of whole-grain foods include oats, whole wheat bread, and brown rice. · Buy whole-grain breads and cereals, instead of white bread or pastries. Limit salt and sodium  · Limit how much salt and sodium you eat to help lower your blood pressure. · Taste food before you salt it. Add only a little salt when you think you need it.  With time, your taste buds will adjust to less salt. · Eat fewer snack items, fast foods, and other high-salt, processed foods. Check food labels for the amount of sodium in packaged foods. · Choose low-sodium versions of canned goods (such as soups, vegetables, and beans). Limit sugar  · Limit drinks and foods with added sugar. These include candy, desserts, and soda pop. Limit alcohol  · Limit alcohol to no more than 2 drinks a day for men and 1 drink a day for women. Too much alcohol can cause health problems. When should you call for help? Watch closely for changes in your health, and be sure to contact your doctor if:    · You would like help planning heart-healthy meals. Where can you learn more? Go to https://Pulpo Media.Bevvy. org and sign in to your Lolly Wolly Doodle account. Enter V137 in the Cantargia box to learn more about \"Heart-Healthy Diet: Care Instructions. \"     If you do not have an account, please click on the \"Sign Up Now\" link. Current as of: December 17, 2020               Content Version: 12.8  © 3856-3535 [employer]. Care instructions adapted under license by [facility] ([facility]). If you have questions about a medical condition or this instruction, always ask your healthcare professional. [address] any warranty or liability for your use of this information. Patient Education        Walking for Exercise: Care Instructions  Your Care Instructions     Walking is one of the easiest ways to get the exercise you need for good health. A brisk, 30-minute walk each day can help you feel better and have more energy. It can help you lower your risk of disease. Walking can help you keep your bones strong and your heart healthy. Check with your doctor before you start a walking plan if you have heart problems, other health issues, or you have not been active in a long time. Follow your doctor's instructions for safe levels of exercise.   Follow-up care is a key part of your treatment and safety. Be sure to make and go to all appointments, and call your doctor if you are having problems. It's also a good idea to know your test results and keep a list of the medicines you take. How can you care for yourself at home? Getting started  · Start slowly and set a short-term goal. For example, walk for 5 or 10 minutes every day. · Bit by bit, increase the amount you walk every day. Try for at least 30 minutes on most days of the week. You also may want to swim, bike, or do other activities. · If finding enough time is a problem, it's fine to be active in shorter periods of time throughout your day. · To get the heart-healthy benefits of walking, you need to walk briskly enough to increase your heart rate and breathing, but not so fast that you can't talk comfortably. · Wear comfortable shoes that fit well and provide good support for your feet and ankles. Staying with your plan  · After you've made walking a habit, set a longer-term goal. You may want to set a goal of walking briskly for longer or walking farther. Experts say to do 2½ hours (150 minutes) of moderate activity a week. A faster heartbeat is what defines moderate-level activity. · To stay motivated, walk with friends, coworkers, or pets. · Use a phone noah or pedometer to track your steps each day. Set a goal to increase your steps. When you reach that goal, set a higher goal.  · If the weather keeps you from walking outside, go for walks at the mall with a friend. Local schools and churches may have indoor gyms where you can walk. Fitting a walk into your workday  · Park several blocks away from work, or get off the bus a few stops early. · Use the stairs instead of the elevator, at least for a few floors. · Suggest holding meetings with colleagues during a walk inside or outside the building. · Use the restroom that is the farthest from your desk or workstation.   · Use your morning and afternoon breaks to take quick 15 minutes walks. Staying safe  · Know your surroundings. Walk in a well-lighted, safe place. If it's dark, walk with a partner. Wear light-colored clothing. If you can, buy a vest or jacket that reflects light. · Carry a cell phone for emergencies. · Drink plenty of water. Take a water bottle with you when you walk. This is very important if it is hot out. · Be careful not to slip on wet or icy ground. You can buy \"grippers\" for your shoes to help keep you from slipping. · Pay attention to your walking surface. Use sidewalks and paths. · If you have health issues such as asthma, COPD, or heart problems, or if you haven't been active for a long time, check with your doctor before you start a new activity. Where can you learn more? Go to https://OSIXpeFloat: [place].im3D. org and sign in to your Voyando account. Enter R159 in the ID AMERICA box to learn more about \"Walking for Exercise: Care Instructions. \"     If you do not have an account, please click on the \"Sign Up Now\" link. Current as of: September 10, 2020               Content Version: 12.8  © 2006-2021 Healthwise, Incorporated. Care instructions adapted under license by [facility] ([facility]). If you have questions about a medical condition or this instruction, always ask your healthcare professional. [place] [address] any warranty or liability for your use of this information.

## 2021-05-05 ENCOUNTER — APPOINTMENT (RX ONLY)
Dept: URBAN - METROPOLITAN AREA CLINIC 174 | Facility: CLINIC | Age: 66
Setting detail: DERMATOLOGY
End: 2021-05-05

## 2021-05-05 VITALS — TEMPERATURE: 96.8 F

## 2021-05-05 DIAGNOSIS — L71.8 OTHER ROSACEA: ICD-10-CM | Status: INADEQUATELY CONTROLLED

## 2021-05-05 PROBLEM — L30.9 DERMATITIS, UNSPECIFIED: Status: ACTIVE | Noted: 2021-05-05

## 2021-05-05 PROCEDURE — ? PRESCRIPTION

## 2021-05-05 PROCEDURE — ? PRESCRIPTION MEDICATION MANAGEMENT

## 2021-05-05 PROCEDURE — ? EDUCATIONAL RESOURCES PROVIDED

## 2021-05-05 PROCEDURE — ? COUNSELING

## 2021-05-05 PROCEDURE — 99204 OFFICE O/P NEW MOD 45 MIN: CPT

## 2021-05-05 PROCEDURE — ? ADDITIONAL NOTES

## 2021-05-05 RX ORDER — METRONIDAZOLE 7.5 MG/G
CREAM TOPICAL BID
Qty: 1 | Refills: 3 | Status: ERX | COMMUNITY
Start: 2021-05-05

## 2021-05-05 RX ADMIN — METRONIDAZOLE: 7.5 CREAM TOPICAL at 00:00

## 2021-05-05 ASSESSMENT — LOCATION DETAILED DESCRIPTION DERM
LOCATION DETAILED: RIGHT INFERIOR MEDIAL FOREHEAD
LOCATION DETAILED: NASAL SUPRATIP
LOCATION DETAILED: LEFT INFERIOR CENTRAL MALAR CHEEK
LOCATION DETAILED: RIGHT CENTRAL MALAR CHEEK
LOCATION DETAILED: RIGHT CHIN

## 2021-05-05 ASSESSMENT — LOCATION SIMPLE DESCRIPTION DERM
LOCATION SIMPLE: CHIN
LOCATION SIMPLE: LEFT CHEEK
LOCATION SIMPLE: RIGHT FOREHEAD
LOCATION SIMPLE: NOSE
LOCATION SIMPLE: RIGHT CHEEK

## 2021-05-05 ASSESSMENT — LOCATION ZONE DERM
LOCATION ZONE: NOSE
LOCATION ZONE: FACE

## 2021-05-05 NOTE — HPI: RASH
What Type Of Note Output Would You Prefer (Optional)?: Bullet Format
How Severe Is Your Rash?: mild
Is This A New Presentation, Or A Follow-Up?: Rash
Additional History: Pt present for evaluation of rash on face

## 2021-05-05 NOTE — PROCEDURE: PRESCRIPTION MEDICATION MANAGEMENT
Detail Level: Zone
Initiate Treatment: Metronidazole cream AA BID
Plan: labs reviewed-anemic, proteinuria\\nalso reviewed CMP
Render In Strict Bullet Format?: No

## 2021-05-05 NOTE — PROCEDURE: ADDITIONAL NOTES
Render Risk Assessment In Note?: no
Detail Level: Detailed
Additional Notes: Pending labs from doctor--ones recently done by rheum, pt to send results\\n-I favor rosacea given few pustules,  but they are small\\n-rec KATE, SSA,SSB--await labs

## 2021-09-20 ENCOUNTER — TELEPHONE (OUTPATIENT)
Dept: CARDIOLOGY CLINIC | Age: 66
End: 2021-09-20

## 2021-09-20 DIAGNOSIS — I25.10 CORONARY ARTERY DISEASE INVOLVING NATIVE CORONARY ARTERY OF NATIVE HEART WITHOUT ANGINA PECTORIS: Primary | ICD-10-CM

## 2021-09-20 DIAGNOSIS — E11.59 TYPE 2 DIABETES MELLITUS WITH OTHER CIRCULATORY COMPLICATION, UNSPECIFIED WHETHER LONG TERM INSULIN USE (HCC): ICD-10-CM

## 2021-09-20 DIAGNOSIS — E78.5 HYPERLIPIDEMIA, UNSPECIFIED HYPERLIPIDEMIA TYPE: ICD-10-CM

## 2021-09-20 DIAGNOSIS — I10 ESSENTIAL HYPERTENSION: ICD-10-CM

## 2021-09-20 NOTE — TELEPHONE ENCOUNTER
Chad Lares called in this afternoon wanting to know if she will need to complete a blood work prior to her appointment with Dr. Elvin Martinez. States that it is normally a lipid lab order and another lab order. Chad Lares can be reached at [phone].

## 2021-09-20 NOTE — TELEPHONE ENCOUNTER
Yes, she can complete CMP and lipid profile 1-2 days prior to her f/u with Dr. Michelle Ward. Orders placed. FAST BEFORE BLOOD WORK/LABS FOR 10-12 HOURS.

## 2021-09-20 NOTE — TELEPHONE ENCOUNTER
Spoke with pt and she is going to complete labs up near in [facility] where she lives- orders were mailed to her house and she was asked to send results back to our office once completed.

## 2021-10-13 RX ORDER — ATORVASTATIN CALCIUM 80 MG/1
80 TABLET, FILM COATED ORAL DAILY
Qty: 90 TABLET | Refills: 0 | Status: SHIPPED | OUTPATIENT
Start: 2021-10-13 | End: 2021-10-19

## 2021-10-13 NOTE — TELEPHONE ENCOUNTER
Medication Refill    Medication needing refilled:  Atorvastatin    Dosage of the medication:  80 mg tablet    How are you taking this medication (QD, BID, TID, QID, PRN):    Take 1 tablet by mouth daily    30 or 90 day supply called in:  80    Which Pharmacy are we sending the medication to?:    Rosezena Prader [address]. [facility] [phone] Breana Rubin [phone]   [address] [facility], [address] [place] [id number]   Phone:  [phone]  Fax:  [phone]

## 2021-10-19 RX ORDER — ATORVASTATIN CALCIUM 80 MG/1
80 TABLET, FILM COATED ORAL DAILY
Qty: 30 TABLET | Refills: 0 | Status: SHIPPED | OUTPATIENT
Start: 2021-10-19 | End: 2022-01-12 | Stop reason: SDUPTHER

## 2021-10-19 NOTE — TELEPHONE ENCOUNTER
Last OV: 3/23/21  Next OV: 11/16/21  Last refill:10/13/21  Most recent Labs: 3/16/21  Last EKG (if needed):11/24/20

## 2021-11-15 NOTE — PROGRESS NOTES
[address]      Cardiology Progress Note    Usman Mccormack  1955 November 16, 2021     CC: \"I have now been working with Endocrinology. \"     HPI:  The patient is 77 y.o. female with a past medical history significant for CAD,S/p PCI and HTN who is here for a follow up. She was admitted to [facility] in 8/15/2017 for severe dehydration secondary to intractable nausea vomiting and diarrhea for 10 days prior. She developed acute renal failure and acute pancreatitis. She was seen by nephrology and continues to follow with them. Today, she reports that her blood sugars are significantly elevated and now following with endocrinology with making medication adjustments last 3 weeks. She states that her heart flutters when her BS is in the 400's. Following specialist for splenic artery aneurysm. She also reports that she needs root canal since 3/2021 and has not undergone and was previously on antibiotics. She admits to medical therapy compliance and tolerating. She denies any abnormal bruising or bleeding.     Past Medical History:   Diagnosis Date    Allergic     Anemia     Angina pectoris (HCC)     Anxiety     Arthritis     CAD (coronary artery disease)     Chronic kidney disease     Colitis     Depression     Frequent headaches     GERD (gastroesophageal reflux disease)     Gout     Heart disease     Heart murmur     Hyperlipidemia     Hypertension     IBS (irritable bowel syndrome)     Lung disease     Migraines     Obesity     Shortness of breath     Sjogren's disease [facility]) September 2015    Dr. Jessica Reed Thyroid disease     hypo when younger    Type 2 diabetes mellitus without complication ([facility] [address]     Type II or unspecified type diabetes mellitus without mention of complication, not stated as uncontrolled     Unspecified cerebral artery occlusion with cerebral infarction     TIA X 2     Past Surgical History:   Procedure Laterality Date    CARDIAC CATHETERIZATION      CORONARY ANGIOPLASTY WITH STENT PLACEMENT  3/27/2014    DILATION AND CURETTAGE OF UTERUS      X2    HYSTERECTOMY      OVARY REMOVAL  1991    PTCA      stent    SINUS SURGERY      URETHRAL STRICTURE DILATATION       Family History   Problem Relation Age of Onset    Diabetes Mother     Heart Disease Mother     Osteoarthritis Mother     Cancer Mother         colon    Heart Disease Father     Coronary Art Dis Father     Hypertension Father     Seizures Son     Osteoarthritis Sister     Seizures Other         family history    Diabetes Other         family history    Osteoarthritis Other         family history    Coronary Art Dis Other         family history    Alzheimer's Disease Other         family history    Hypertension Brother     Elevated Lipids Brother     Osteoarthritis Brother     Allergies Brother      Social History     Tobacco Use    Smoking status: Never Smoker    Smokeless tobacco: Never Used   Vaping Use    Vaping Use: Never used   Substance Use Topics    Alcohol use: No    Drug use: No     Comment: not reported       Allergies   Allergen Reactions    Bee Venom     Hydroxychloroquine Other (See Comments)     pancreatitis    Macrodantin [Nitrofurantoin]      High fevers    Quercus Robur     Singulair [Montelukast Sodium]      Heart racing    Sulfa Antibiotics      Doesn't remember    Wasp Venom     Ciprofloxacin Nausea And Vomiting    Flagyl [Metronidazole] Nausea And Vomiting     Current Outpatient Medications   Medication Sig Dispense Refill    Ferrous Sulfate (IRON HIGH-POTENCY) 325 MG TABS TAKE 1 TABLET BY MOUTH ONCE DAILY      levocetirizine (XYZAL) 5 MG tablet       atorvastatin (LIPITOR) 80 MG tablet TAKE 1 TABLET BY MOUTH DAILY 30 tablet 0    carvedilol (COREG) 12.5 MG tablet Take 12.5 mg by mouth 2 times daily (with meals)      POTASSIUM PO Take 20 mEq by mouth 2 times daily       magnesium oxide (MAG-OX) 400 MG tablet Take 400 mg by mouth 3 times daily       clopidogrel (PLAVIX) 75 MG tablet Take 1 tablet by mouth daily 90 tablet 4    nitroGLYCERIN (NITROSTAT) 0.4 MG SL tablet PLACE 1 TABLET UNDER THE TONGUE EVERY 5 MINUTES AS NEEDED FOR CHEST PAIN 25 tablet 6    fexofenadine (ALLEGRA ALLERGY) 180 MG tablet Take 180 mg by mouth daily      famotidine (PEPCID) 20 MG tablet Take 20 mg by mouth 2 times daily      sodium bicarbonate 650 MG tablet Take 325 mg by mouth daily       Probiotic Product (PROBIOTIC PO) Take by mouth daily      lubiprostone (AMITIZA) 24 MCG capsule Take 24 mcg by mouth 2 times daily (with meals)      bethanechol (URECHOLINE) 25 MG tablet Take 25 mg by mouth 2 times daily      insulin glargine (LANTUS) 100 UNIT/ML injection vial Inject 75 Units into the skin daily       verapamil (CALAN-SR) 180 MG CR tablet Take 90 mg by mouth every morning       pilocarpine (SALAGEN) 5 MG tablet Take 5 mg by mouth 2 times daily       bumetanide (BUMEX) 2 MG tablet Take 2 mg by mouth daily       aspirin 81 MG chewable tablet Take 1 tablet by mouth daily. 30 tablet 3    FLUTICASONE PROPIONATE, NASAL, NA 2 sprays by Nasal route daily as needed (rhinitis)       lisinopril (PRINIVIL;ZESTRIL) 20 MG tablet Take 20 mg by mouth 2 times daily       Multiple Vitamins-Minerals (MULTI FOR HER PO) Take 1 tablet by mouth daily.  docusate sodium (STOOL SOFTENER) 100 MG capsule Take 200 mg by mouth 2 times daily 2 tablets      Calcium Carbonate Antacid (TUMS E-X 750 PO) Take 1 tablet by mouth as needed        No current facility-administered medications for this visit. Review of Systems:  · Constitutional: no unanticipated weight loss. There's been no change in energy level, sleep pattern, or activity level. No fevers, chills. · Eyes: No visual changes or diplopia. No scleral icterus. · ENT: No Headaches, hearing loss or vertigo. No mouth sores or sore throat.   · Cardiovascular: as reviewed in HPI  · Respiratory: No cough or wheezing, no sputum production. No hematemesis. · Gastrointestinal: No abdominal pain, appetite loss, blood in stools. No change in bowel or bladder habits. · Genitourinary: No dysuria, trouble voiding, or hematuria. · Musculoskeletal:  No gait disturbance, no joint complaints. · Integumentary: No rash or pruritis. · Neurological: No headache, diplopia, change in muscle strength, numbness or tingling. · Psychiatric: No anxiety or depression. · Endocrine: No temperature intolerance. No excessive thirst, fluid intake, or urination. No tremor. · Hematologic/Lymphatic: No abnormal bruising or bleeding, blood clots or swollen lymph nodes. · Allergic/Immunologic: No nasal congestion or hives. Physical Exam:   /66   Pulse 69   Ht 5' 8\" (1.727 m)   Wt 233 lb (105.7 kg)   SpO2 97%   BMI 35.43 kg/m²   Wt Readings from Last 3 Encounters:   11/16/21 233 lb (105.7 kg)   03/23/21 231 lb (104.8 kg)   11/24/20 225 lb (102.1 kg)     Constitutional: She is oriented to person, place, and time. She appears well-developed and well-nourished. In no acute distress. Head: Normocephalic and atraumatic. Pupils equal and round. Neck: Neck supple. No JVP or carotid bruit appreciated. No mass and no thyromegaly present. No lymphadenopathy present. Cardiovascular: Normal rate. Normal heart sounds. Exam reveals no gallop and no friction rub. No murmur heard. Pulmonary/Chest: Effort normal and breath sounds normal. No respiratory distress. She has no wheezes, rhonchi or rales. Abdominal: Soft, non-tender. Bowel sounds are normal. She exhibits no organomegaly, mass or bruit. Extremities: No edema, cyanosis, or clubbing. Pulses are 2+ radial/dorsalis pedis/posterior tibial/carotid bilaterally. Neurological: No gross cranial nerve deficit. Coordination normal.   Skin: Skin is warm and dry. There is no rash or diaphoresis. Psychiatric: She has a normal mood and affect.  Her speech is normal and behavior is normal.     Lab Review:   [facility] 3/2021, 11/9/21, 10/5/21  Lab Results   Component Value Date    TRIG 158 03/16/2021    HDL 35 03/16/2021    HDL 34 04/03/2012    LDLCALC 43 03/16/2021    LABVLDL 32 03/16/2021      Lab Results   Component Value Date     11/11/2020      Lab Results   Component Value Date    HGB 9.4 11/11/2020    HCT 28.2 11/11/2020     Lab Results   Component Value Date     11/09/2021     Lab Results   Component Value Date    K 3.5 11/09/2021    K 3.5 11/11/2020     Lab Results   Component Value Date    BUN 24 11/09/2021    CREATININE 1.3 11/11/2020     EKG Interpretation:   5/2/18 Sinus Rhythm  Poor R wave progression  5/21/19 Sinus rhythm   2/21/2020 Sinus rhythm Poor R wave progression  8/24/20 Sinus  Rhythm with poor R wave progression  11/24/20 Sinus  Rhythm Old anterior infarct. ~82 bpm.   11/16/21: not completed     Image Review:     Stress 4/12/2016  Summary    There is normal isotope uptake at stress and rest. There is no evidence of    myocardial ischemia or scar.        With Lexiscan vasodilator stress the patient had no significant ST changes.    Nondiagnostic for ischemia. 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 Technically a satisfactory study.    2. Normal treadmill exercise stress portion of the study.    3. No evidence of Ischemia by Myocardial Perfusion Imaging.    4. Gated Study shows normal LV size and Systolic function; EF is 20%.    Pharmacological Stress/MPI Results:     Stress test:11/9/20  Summary     Pharmacological Stress/MPI Results:          1.  Technically a satisfactory study.     2. Normal pharmacological stress portion of the study.     3. Small sized mild intensity reversible defect suggestive of small area of     ischemia involving the distal inferior wall.     4. Gated Study shows normal LV size and Systolic function; EF is 79%. [facility]:11/9/20  CONCLUSIONS:  1. Single-vessel coronary artery disease with 80% mid distal RCA and  50% mid LAD. 2.  Minor obstruction of the arteries and branches. 3.  Normal left ventricular size and systolic function. 4.  Elevated left heart filling pressures.     INTERVENTIONAL PROCEDURE:  Catheters used are JR4 guide with side holes,  Runthrough wire, a 2.5 x 15 mm KRISTI stent was used.     TECHNICAL COMMENTS:  The guide provided somewhat poor support for the  intervention procedure. However, the lesion was crossed without much  difficulty. I had some difficulty getting the stent across because of  the poor guide support but I was able to deliver it and dilated it up to  14 atmospheres which corresponds to a vessel size of approximately 2.7. Followup angiography shows 0% residual.     CONCLUSION:  Successful PCI and stenting of the mid distal RCA lesion  reduced from 80% to 0%. Assessment/Plan:    Coronary artery disease  -She underwent [facility] s/p successful PCI and stenting mid distal RCA on 11/10/20 per Dr. Fabrizio Lambert.   -Today, she denies any signs of symptoms of angina and has remained stable. -she is on BBlocker, ACEI, statin and dual antiplatelet therapy. -[facility] 11/9/21 [place]. Hyperlipidemia, unspecified  -Stable with no reported myalgias.   -She is on Lipitor 80 mg daily.   -3/16/21 LDLc 43, , HDL 35. Essential hypertension  -Blood pressure on current medical therapy with Coreg and Lisinopril adjusted by nephrology. -[facility] 11/9/21 [place] stable.  -I have asked her to work on a low salt diet and continue to monitor. History of SVT. -She denies any further heart palpitations, fluttering, racing and has remained stable on beta blocker therapy. Diabetes  -Now working with Endocrinology to control her DM-II.  Last visit 10/7/21.   -The Endocrinologist recommended stopping Victoza (due to history of pancreatitis and gastroparesis) and metformin (due to declining GFR.)   -We had started her on Jardiance 10 mg daily but it was determined per her Nephrologist to discontinue due to RTA. -She has discussed and has mentioned to her Endocrinologist 10/7/21 and due to return this week. This patient has type 2 diabetes and established CV disease. The American Diabetes Association guidelines state, Among patients with type 2 diabetes who have established atherosclerotic cardiovascular disease, SGLT2 inhibitors demonstrated cardiovascular disease benefit are recommended as part of the antihyperglycemic regimen. SGLT2 inhibitor was added to current antihyperglycemic regimen due to demonstrated cardiovascular benefit. History of hypokalemia  She is off of spironolactone but has remained on KCL supplement per Nephrologist. Last K 3.5 11/9/21. Migraines  She is on Toprol-XL. Follow up in 6-7 months. Thank you very much for allowing me to participate in the care of your patient. Please do not hesitate to contact me if you have any questions. Sincerely,  Lou Cordero MD      [facility], [address] [place] [address]  Ph: [phone]  Fax: [phone]    This note was scribed in the presence of Dr. Mary Koch MD by Jeana Torres RN.

## 2021-11-16 ENCOUNTER — OFFICE VISIT (OUTPATIENT)
Dept: CARDIOLOGY CLINIC | Age: 66
End: 2021-11-16
Payer: MEDICAID

## 2021-11-16 VITALS
SYSTOLIC BLOOD PRESSURE: 118 MMHG | DIASTOLIC BLOOD PRESSURE: 66 MMHG | HEART RATE: 69 BPM | HEIGHT: 68 IN | BODY MASS INDEX: 35.31 KG/M2 | WEIGHT: 233 LBS | OXYGEN SATURATION: 97 %

## 2021-11-16 DIAGNOSIS — I10 ESSENTIAL HYPERTENSION: ICD-10-CM

## 2021-11-16 DIAGNOSIS — I25.10 CORONARY ARTERY DISEASE INVOLVING NATIVE CORONARY ARTERY OF NATIVE HEART WITHOUT ANGINA PECTORIS: Primary | ICD-10-CM

## 2021-11-16 DIAGNOSIS — E78.5 HYPERLIPIDEMIA, UNSPECIFIED HYPERLIPIDEMIA TYPE: ICD-10-CM

## 2021-11-16 DIAGNOSIS — Z86.79 HISTORY OF PAROXYSMAL SUPRAVENTRICULAR TACHYCARDIA: ICD-10-CM

## 2021-11-16 PROCEDURE — 3017F COLORECTAL CA SCREEN DOC REV: CPT | Performed by: INTERNAL MEDICINE

## 2021-11-16 PROCEDURE — 99214 OFFICE O/P EST MOD 30 MIN: CPT | Performed by: INTERNAL MEDICINE

## 2021-11-16 PROCEDURE — G8400 PT W/DXA NO RESULTS DOC: HCPCS | Performed by: INTERNAL MEDICINE

## 2021-11-16 PROCEDURE — 1123F ACP DISCUSS/DSCN MKR DOCD: CPT | Performed by: INTERNAL MEDICINE

## 2021-11-16 PROCEDURE — 4040F PNEUMOC VAC/ADMIN/RCVD: CPT | Performed by: INTERNAL MEDICINE

## 2021-11-16 PROCEDURE — G8484 FLU IMMUNIZE NO ADMIN: HCPCS | Performed by: INTERNAL MEDICINE

## 2021-11-16 PROCEDURE — 1036F TOBACCO NON-USER: CPT | Performed by: INTERNAL MEDICINE

## 2021-11-16 PROCEDURE — G8417 CALC BMI ABV UP PARAM F/U: HCPCS | Performed by: INTERNAL MEDICINE

## 2021-11-16 PROCEDURE — 1090F PRES/ABSN URINE INCON ASSESS: CPT | Performed by: INTERNAL MEDICINE

## 2021-11-16 PROCEDURE — G8427 DOCREV CUR MEDS BY ELIG CLIN: HCPCS | Performed by: INTERNAL MEDICINE

## 2021-11-16 RX ORDER — PHENOL 1.4 %
AEROSOL, SPRAY (ML) MUCOUS MEMBRANE
COMMUNITY
Start: 2021-09-14

## 2021-11-16 RX ORDER — LEVOCETIRIZINE DIHYDROCHLORIDE 5 MG/1
TABLET, FILM COATED ORAL
COMMUNITY
Start: 2021-11-05

## 2021-11-16 NOTE — PATIENT INSTRUCTIONS
Patient Education        Learning About Meal Planning for Diabetes  Why plan your meals? Meal planning can be a key part of managing diabetes. Planning meals and snacks with the right balance of carbohydrate, protein, and fat can help you keep your blood sugar at the target level you set with your doctor. You don't have to eat special foods. You can eat what your family eats, including sweets once in a while. But you do have to pay attention to how often you eat and how much you eat of certain foods. You may want to work with a dietitian or a certified diabetes educator. He or she can give you tips and meal ideas and can answer your questions about meal planning. This health professional can also help you reach a healthy weight if that is one of your goals. What plan is right for you? Your dietitian or diabetes educator may suggest that you start with the plate format or carbohydrate counting. The plate format  The plate format is a simple way to help you manage how you eat. You plan meals by learning how much space each food should take on a plate. Using the plate format helps you spread carbohydrate throughout the day. It can make it easier to keep your blood sugar level within your target range. It also helps you see if you're eating healthy portion sizes. To use the plate format, you put non-starchy vegetables on half your plate. Add meat or meat substitutes on one-quarter of the plate. Put a grain or starchy vegetable (such as brown rice or a potato) on the final quarter of the plate. You can add a small piece of fruit and some low-fat or fat-free milk or yogurt, depending on your carbohydrate goal for each meal.  Here are some tips for using the plate format:  · Make sure that you are not using an oversized plate. A 9-inch plate is best. Many restaurants use larger plates. · Get used to using the plate format at home. Then you can use it when you eat out.   · Write down your questions about using the plate format. Talk to your doctor, a dietitian, or a diabetes educator about your concerns. Carbohydrate counting  With carbohydrate counting, you plan meals based on the amount of carbohydrate in each food. Carbohydrate raises blood sugar higher and more quickly than any other nutrient. It is found in desserts, breads and cereals, and fruit. It's also found in starchy vegetables such as potatoes and corn, grains such as rice and pasta, and milk and yogurt. Spreading carbohydrate throughout the day helps keep your blood sugar levels within your target range. Your daily amount depends on several things, including your weight, how active you are, which diabetes medicines you take, and what your goals are for your blood sugar levels. A registered dietitian or diabetes educator can help you plan how much carbohydrate to include in each meal and snack. A guideline for your daily amount of carbohydrate is:  · 45 to 60 grams at each meal. That's about the same as 3 to 4 carbohydrate servings. · 15 to 20 grams at each snack. That's about the same as 1 carbohydrate serving. The Nutrition Facts label on packaged foods tells you how much carbohydrate is in a serving of the food. First, look at the serving size on the food label. Is that the amount you eat in a serving? All of the nutrition information on a food label is based on that serving size. So if you eat more or less than that, you'll need to adjust the other numbers. Total carbohydrate is the next thing you need to look for on the label. If you count carbohydrate servings, one serving of carbohydrate is 15 grams. For foods that don't come with labels, such as fresh fruits and vegetables, you'll need a guide that lists carbohydrate in these foods. Ask your doctor, dietitian, or diabetes educator about books or other nutrition guides you can use.   If you take insulin, you need to know how many grams of carbohydrate are in a meal. This lets you know how much rapid-acting insulin to take before you eat. If you use an insulin pump, you get a constant rate of insulin during the day. So the pump must be programmed at meals to give you extra insulin to cover the rise in blood sugar after meals. When you know how much carbohydrate you will eat, you can take the right amount of insulin. Or, if you always use the same amount of insulin, you need to make sure that you eat the same amount of carbohydrate at meals. If you need more help to understand carbohydrate counting and food labels, ask your doctor, dietitian, or diabetes educator. How can you plan healthy meals? Here are some tips to get started:  · Plan your meals a week at a time. Don't forget to include snacks too. · Use cookbooks or online recipes to plan several main meals. Plan some quick meals for busy nights. You also can double some recipes that freeze well. Then you can save half for other busy nights when you don't have time to cook. · Make sure you have the ingredients you need for your recipes. If you're running low on basic items, put these items on your shopping list too. · List foods that you use to make breakfasts, lunches, and snacks. List plenty of fruits and vegetables. · Post this list on the refrigerator. Add to it as you think of more things you need. · Take the list to the store to do your weekly shopping. Follow-up care is a key part of your treatment and safety. Be sure to make and go to all appointments, and call your doctor if you are having problems. It's also a good idea to know your test results and keep a list of the medicines you take. Where can you learn more? Go to https://goldie.Backlift. org and sign in to your [employer] account. Enter X446 in the [facility] box to learn more about \"Learning About Meal Planning for Diabetes. \"     If you do not have an account, please click on the \"Sign Up Now\" link.   Current as of: December 17, 2020               Content Version: 13.0  © 2006-2021 Healthwise, [employer]. Care instructions adapted under license by [facility] ([facility]). If you have questions about a medical condition or this instruction, always ask your healthcare professional. [address] any warranty or liability for your use of this information. Patient Education        How to Read a Food Label to Limit Sodium: Care Instructions  Overview  Limiting sodium can be an important part of managing some health problems. Processed foods, fast food, and restaurant foods are the major sources of dietary sodium. The most common name for sodium is salt. Most packaged foods have a Nutrition Facts label. This will tell you how much sodium is in one serving of food. Follow-up care is a key part of your treatment and safety. Be sure to make and go to all appointments, and call your doctor if you are having problems. It's also a good idea to know your test results and keep a list of the medicines you take. How can you care for yourself at home? Read ingredient lists on food labels  · Read the list of ingredients on food labels to help you find how much sodium is in a food. The label lists the ingredients in a food in descending order (from the most to the least). If salt or sodium is high on the list, there may be a lot of sodium in the food. · Know that sodium has different names. Sodium is also called monosodium glutamate (MSG, common in [facility] food), sodium citrate, sodium alginate, and sodium phosphate. Read Nutrition Facts labels  · On most foods, there is a Nutrition Facts label. This will tell you how much sodium is in one serving of food. Look at both the serving size and the sodium amount. The serving size is located at the top of the label, usually right under the \"Nutrition Facts\" title. The amount of sodium is given in the list under the title. It is given in milligrams (mg). ?  Check the serving size carefully. A single serving is often very small, and you may eat more than one serving. If this is the case, you will eat more sodium than listed on the label. For example, if the serving size for a canned soup is 1 cup and the sodium amount is 470 mg, if you have 2 cups you will eat 940 mg of sodium. · The nutrition facts for fresh fruits and vegetables are not listed on the food. They may be listed somewhere in the store. These foods usually have no sodium or low sodium. · The Nutrition Facts label also gives you the Percent Daily Value for sodium. This is how much of the recommended amount of sodium a serving contains. The daily value for sodium is less than 2,300 mg. So if the Percent Daily Value says 50%, this means one serving is giving you half of this, or 1,150 mg. Buy low-sodium foods  · Look for foods that are made with less sodium. Watch for the following words on the label. ? \"Unsalted\" means there is no sodium added to the food. But there may be sodium already in the food naturally. ? \"Sodium-free\" means a serving has less than 5 mg of sodium. ? \"Very low sodium\" means a serving has 35 mg or less of sodium. ? \"Low-sodium\" means a serving has 140 mg or less of sodium. · \"Reduced-sodium\" means that there is 25% less sodium than what the food normally has. This is still usually too much sodium. Try not to buy foods with this on the label. · Buy fresh vegetables, or frozen vegetables without added sauces. Buy low-sodium versions of canned vegetables, soups, and other canned goods. Where can you learn more? Go to https://Opal LabspepicewOpenZine.Kluster. org and sign in to your Chronogolf account. Enter 26 [id number] in the [facility] box to learn more about \"How to Read a Food Label to Limit Sodium: Care Instructions. \"     If you do not have an account, please click on the \"Sign Up Now\" link. Current as of: December 17, 2020               Content Version: 13.0  © 4206-6246 Healthwise, [facility]. 2021               Content Version: 13.0  © 2006-2021 Healthwise, [employer]. Care instructions adapted under license by [facility] ([facility]). If you have questions about a medical condition or this instruction, always ask your healthcare professional. [address] any warranty or liability for your use of this information. Patient Education        Learning About Healthy Weight  What is a healthy weight? A healthy weight is the weight at which you feel good about yourself and have energy for work and play. It's also one that lowers your risk for health problems. What can you do to stay at a healthy weight? It can be hard to stay at a healthy weight, especially when fast food, vending-machine snacks, and processed foods are so easy to find. And with your busy lifestyle, activity may be low on your list of things to do. But staying at a healthy weight may be easier than you think. Here are some dos and don'ts for staying at a healthy weight. Do eat healthy foods  The kinds of foods you eat have a big impact on both your weight and your health. Reaching and staying at a healthy weight is not about going on a diet. It's about making healthier food choices every day and changing your diet for good. Healthy eating means eating a variety of foods so that you get all the nutrients you need. Your body needs protein, carbohydrate, and fats for energy. They keep your heart beating, your brain active, and your muscles working. On most days, try to eat from each food group. This means eating a variety of:  · Whole grains, such as whole wheat breads and pastas. · Fruits and vegetables. · Dairy products, such as low-fat milk, yogurt, and cheese. · Lean proteins, such as all types of fish, chicken without the skin, and beans. Don't have too much or too little of one thing. All foods, if eaten in moderation, can be part of healthy eating. Even sweets can be okay.   If your favorite foods are high in fat, salt, sugar, or calories, limit how often you eat them. Eat smaller servings, or look for healthy substitutes. Do watch what you eat  Many people eat more than their bodies need. Part of staying at a healthy weight means learning how much food you really need from day to day and not eating more than that. Even with healthy foods, eating too much can make you gain weight. Having a well-balanced diet means that you eat enough, but not too much, and that your food gives you the nutrients you need to stay healthy. So listen to your body. Eat when you're hungry. Stop when you feel satisfied. It's a good idea to have healthy snacks ready for when you get hungry. Keep healthy snacks with you at work, in your car, and at home. If you have a healthy snack easily available, you'll be less likely to pick a candy bar or bag of chips from a vending machine instead. Some healthy snacks you might want to keep on hand are fruit, low-fat yogurt, string cheese, low-fat microwave popcorn, raisins and other dried fruit, nuts, whole wheat crackers, pretzels, carrots, celery sticks, and broccoli. Do some physical activity  A big part of reaching and staying at a healthy weight is being active. When you're active, you burn calories. This makes it easier to reach and stay at a healthy weight. When you're active on a regular basis, your body burns more calories, even when you're at rest. Being active helps you lose fat and build lean muscle. Try to be active for at least 1 hour every day. This may sound like a lot, but it's okay to be active in smaller blocks of time that add up to 1 hour a day. Any activity that makes your heart beat faster and keeps it there for a while counts. A brisk walk, run, or swim will get your heart beating faster. So will climbing stairs, shooting baskets, or cycling. Even some household chores like vacuuming and mowing the lawn will get your heart rate up.   Pick activities that you enjoyones that make your heart beat faster, your muscles stronger, and your muscles and joints more flexible. If you find more than one thing you like doing, do them all. You don't have to do the same thing every day. Don't diet  Diets don't work. Diets are temporary. Because you give up so much when you diet, you may be hungry and think about food all the time. And after you stop dieting, you also may overeat to make up for what you missed. Most people who diet end up gaining back the pounds they lostand more. Remember that healthy bodies come in lots of shapes and sizes. Everyone can get healthier by eating better and being more active. Where can you learn more? Go to https://ReachDynamics.ROVOP. org and sign in to your HuoBi account. Enter [id number] [id number] in the [employer] box to learn more about \"Learning About Healthy Weight. \"     If you do not have an account, please click on the \"Sign Up Now\" link. Current as of: March 17, 2021               Content Version: 13.0  © 3120-8126 [employer]. Care instructions adapted under license by [facility] ([facility]). If you have questions about a medical condition or this instruction, always ask your healthcare professional. [address] any warranty or liability for your use of this information.

## 2021-11-18 RX ORDER — NITROGLYCERIN 0.4 MG/1
TABLET SUBLINGUAL
Qty: 25 TABLET | Refills: 6 | Status: SHIPPED | OUTPATIENT
Start: 2021-11-18

## 2021-11-18 NOTE — TELEPHONE ENCOUNTER
Medication Refill    Medication needing refilled:  nitroglycerin    Dosage of the medication:  0.4 MG SL tablet     How are you taking this medication (QD, BID, TID, QID, PRN):  PLACE 1 TABLET UNDER THE TONGUE EVERY 5 MINUTES AS NEEDED FOR CHEST PAIN      Which Pharmacy are we sending the medication to?:    Tiff Sosa [address]. [facility] [phone] Juan M Bowman [phone]   [address] [place] [address], [address] [place] [id number]   Phone:  [phone]  Fax:  [phone]

## 2021-12-17 ENCOUNTER — TELEPHONE (OUTPATIENT)
Dept: CARDIOLOGY CLINIC | Age: 66
End: 2021-12-17

## 2021-12-17 DIAGNOSIS — I10 ESSENTIAL HYPERTENSION: Primary | ICD-10-CM

## 2021-12-17 NOTE — TELEPHONE ENCOUNTER
Linda Yu is calling in wanting to notify Dr. Don Sherman that she had labs drawn at [facility] and her renal panel was high. Her kidney doctor and PCP both told her it was from the [place] so Linda Yu stopped taking it. Linda Esparzaraul is having lab work drawn again with [place] next week.        Linda Yu can be reached at [phone]

## 2021-12-20 NOTE — TELEPHONE ENCOUNTER
Dr. Daniel Foote,    Please review lab results CARE EVERYWHERE. PAT [facility]) and advise.     Thanks,  Susanna Mclean RN

## 2021-12-20 NOTE — TELEPHONE ENCOUNTER
Jennifer Gonzalez called in and is asking if Dr. Araceli George ever got her massage about her lab results?      She can be reached back at [phone]

## 2021-12-22 NOTE — TELEPHONE ENCOUNTER
Spoke with patient regarding recommendations. Her dose of carvedilol was previously reduced by her PCP. Instructed to monitor her BP at home with now holding lisinopril as well. She v/u and will call with any new concerns.      Lab order placed and mailed to her per her request.

## 2021-12-22 NOTE — TELEPHONE ENCOUNTER
I reviewed her labs from [place] as well as her medications. I do not think her worsening renal function is due to [place]. This may be related to her being on Bumex and lisinopril therapy. Have her hold both Bumex and lisinopril for now and repeat her renal function in about 2 to 3 weeks and call us back.   She can continue to hold Jardiance till her repeat blood tests are drawn in few weeks

## 2021-12-22 NOTE — TELEPHONE ENCOUNTER
Sara Almanzar called in this morning wanting to know the status of Dr. Kya Rosales reviewing her labs. She also stated she had labs done 12/20 at [facility] and wanted to know if those results were back yet. Sara Almanzar also stated that her BP is running low, 98/62, so her PCP decreased her Carvedilol.       You can reach Sara Almanzar at #[phone]

## 2022-01-04 NOTE — TELEPHONE ENCOUNTER
Patient returned call and she does currently follow with nephrologist.     Ena Cos to stop Shahid Hay since she has been having increased bleeding since on Eliquis for DVT. She will be on Elqiuis for 3 months.

## 2022-01-04 NOTE — TELEPHONE ENCOUNTER
Left message for patient to return call. We will contact you with the echo results    Use pain med sparingly     Keep working on healthy diet/exercise and wt loss

## 2022-01-04 NOTE — TELEPHONE ENCOUNTER
Worsening of creatinine noted. Have encouraged patient to drink more water. She needs to see nephrology. Continue to hold Bumex and lisinopril.   Repeat BMP in 1 month

## 2022-01-05 RX ORDER — CLOPIDOGREL BISULFATE 75 MG/1
75 TABLET ORAL DAILY
Qty: 90 TABLET | Refills: 1 | Status: SHIPPED | OUTPATIENT
Start: 2022-01-05 | End: 2022-06-06

## 2022-01-06 NOTE — TELEPHONE ENCOUNTER
Spoke with Dr Karyn Greenberg and okay to stop the [place] while she is Eliquis. Please make patient aware.

## 2022-01-12 NOTE — TELEPHONE ENCOUNTER
Last OV 11/16/21  Next OV 6/21/22  CMP 11/9/21  Lipid panel 3/16/21
Medication Refill    Medication needing refilled:  atorvastatin (LIPITOR)     Dosage of the medication:  80 MG tablet     How are you taking this medication (QD, BID, TID, QID, PRN):  TAKE 1 TABLET BY MOUTH DAILY    30 or 90 day supply called in: 90 day supply     When will you run out of your medication:    Which Pharmacy are we sending the medication to?:    [facility]  [address] [facility] [phone]   [address] [place] [address], [address] [facility] [id number]   Phone:  [phone]  Fax:  [phone]
Name of MD Notified: (Please Specify)

## 2022-01-13 RX ORDER — ATORVASTATIN CALCIUM 80 MG/1
80 TABLET, FILM COATED ORAL DAILY
Qty: 90 TABLET | Refills: 3 | Status: SHIPPED | OUTPATIENT
Start: 2022-01-13

## 2022-06-06 NOTE — TELEPHONE ENCOUNTER
Last OV: 11/16/21  Next OV: 6/21/22  Last refill:1/5/22  Most recent Labs: 12/22/21  Last EKG (if needed):11/11/20

## 2022-06-07 RX ORDER — CLOPIDOGREL BISULFATE 75 MG/1
75 TABLET ORAL DAILY
Qty: 90 TABLET | Refills: 0 | Status: SHIPPED | OUTPATIENT
Start: 2022-06-07 | End: 2022-09-12

## 2022-09-12 RX ORDER — CLOPIDOGREL BISULFATE 75 MG/1
75 TABLET ORAL DAILY
Qty: 90 TABLET | Refills: 0 | Status: SHIPPED | OUTPATIENT
Start: 2022-09-12

## 2022-12-07 NOTE — TELEPHONE ENCOUNTER
Last OV:2022  Last Labs:bmp 2022  Last Refills:2022  Next Appt:  Return Follow up in 6-7 months with Dr. Joao Puri.            Last EK2020

## 2022-12-08 RX ORDER — CLOPIDOGREL BISULFATE 75 MG/1
75 TABLET ORAL DAILY
Qty: 90 TABLET | Refills: 3 | Status: SHIPPED | OUTPATIENT
Start: 2022-12-08

## 2023-08-12 NOTE — TELEPHONE ENCOUNTER
Please refer to closed telephone encounter from 08/31/2020. Ladi Zamudio called in stating that Dr. Olievrio Castañeda office never got our cardiac clearance letter. She states that Shikha Miranda, the surgical coordinator for his office, called her and told her that if she can't hold the medications requested for at LEAST 10 days, the surgeon will not do the procedure. Her cardiac clearance letter from Dr. Radha Mendoza said she could only hold them for 5 to 7 days. Ladi Zamudio is frustrated because she states that we should be in contact with the surgeon's office instead of her going back and forth between offices trying to get it figured out. Ladi Zamudio gave me the phone number for Shikha Miranda, which is [phone]. You can reach Ladi Zamudio at [phone]. normal

## 2023-09-18 ENCOUNTER — HOSPITAL ENCOUNTER (OUTPATIENT)
Dept: NON INVASIVE DIAGNOSTICS | Age: 68
Discharge: HOME | End: 2023-09-18
Payer: MEDICARE

## 2023-09-18 DIAGNOSIS — I25.10 DISEASE OF CARDIOVASCULAR SYSTEM: ICD-10-CM

## 2023-09-18 DIAGNOSIS — R06.09 DOE (DYSPNEA ON EXERTION): ICD-10-CM

## 2023-09-18 PROCEDURE — 3430000000 HC RX DIAGNOSTIC RADIOPHARMACEUTICAL: Performed by: INTERNAL MEDICINE

## 2023-09-18 PROCEDURE — 78452 HT MUSCLE IMAGE SPECT MULT: CPT

## 2023-09-18 PROCEDURE — A9502 TC99M TETROFOSMIN: HCPCS | Performed by: INTERNAL MEDICINE

## 2023-09-18 PROCEDURE — 6360000002 HC RX W HCPCS: Performed by: INTERNAL MEDICINE

## 2023-09-18 PROCEDURE — 93017 CV STRESS TEST TRACING ONLY: CPT

## 2023-09-18 RX ORDER — REGADENOSON 0.08 MG/ML
0.4 INJECTION, SOLUTION INTRAVENOUS
Status: COMPLETED | OUTPATIENT
Start: 2023-09-18 | End: 2023-09-18

## 2023-09-18 RX ADMIN — TETROFOSMIN 10 MILLICURIE: 1.38 INJECTION, POWDER, LYOPHILIZED, FOR SOLUTION INTRAVENOUS at 11:19

## 2023-09-18 RX ADMIN — REGADENOSON 0.4 MG: 0.08 INJECTION, SOLUTION INTRAVENOUS at 12:38

## 2023-09-18 RX ADMIN — TETROFOSMIN 30 MILLICURIE: 1.38 INJECTION, POWDER, LYOPHILIZED, FOR SOLUTION INTRAVENOUS at 12:40

## 2023-12-15 RX ORDER — CLOPIDOGREL BISULFATE 75 MG/1
75 TABLET ORAL DAILY
Qty: 30 TABLET | Refills: 0 | OUTPATIENT
Start: 2023-12-15

## 2023-12-15 NOTE — TELEPHONE ENCOUNTER
Called patient left message to call back and make a 7 month follow up with Dr Cavanaugh. Patient last seen 11/16/2021.
